# Patient Record
Sex: MALE | Race: BLACK OR AFRICAN AMERICAN | Employment: FULL TIME | URBAN - METROPOLITAN AREA
[De-identification: names, ages, dates, MRNs, and addresses within clinical notes are randomized per-mention and may not be internally consistent; named-entity substitution may affect disease eponyms.]

---

## 2023-07-11 ENCOUNTER — HOSPITAL ENCOUNTER (INPATIENT)
Age: 58
LOS: 3 days | Discharge: HOME | End: 2023-07-14
Attending: EMERGENCY MEDICINE | Admitting: INTERNAL MEDICINE
Payer: OTHER GOVERNMENT

## 2023-07-11 ENCOUNTER — APPOINTMENT (OUTPATIENT)
Dept: GENERAL RADIOLOGY | Age: 58
End: 2023-07-11
Payer: OTHER GOVERNMENT

## 2023-07-11 DIAGNOSIS — R07.9 CHEST PAIN, UNSPECIFIED TYPE: Primary | ICD-10-CM

## 2023-07-11 DIAGNOSIS — R06.02 SHORTNESS OF BREATH: ICD-10-CM

## 2023-07-11 LAB
ALBUMIN SERPL-MCNC: 4.2 G/DL (ref 3.4–5)
ALBUMIN/GLOB SERPL: 1.4 {RATIO} (ref 1.1–2.2)
ALP SERPL-CCNC: 70 U/L (ref 40–129)
ALT SERPL-CCNC: 30 U/L (ref 10–40)
ANION GAP SERPL CALCULATED.3IONS-SCNC: 16 MMOL/L (ref 3–16)
ANTI-XA UNFRAC HEPARIN: 0.83 IU/ML (ref 0.3–0.7)
APTT BLD: 27.1 SEC (ref 22.7–35.9)
AST SERPL-CCNC: 33 U/L (ref 15–37)
BASOPHILS # BLD: 0 K/UL (ref 0–0.2)
BASOPHILS NFR BLD: 0.8 %
BILIRUB SERPL-MCNC: 0.5 MG/DL (ref 0–1)
BUN SERPL-MCNC: 13 MG/DL (ref 7–20)
CALCIUM SERPL-MCNC: 8.9 MG/DL (ref 8.3–10.6)
CHLORIDE SERPL-SCNC: 102 MMOL/L (ref 99–110)
CO2 SERPL-SCNC: 20 MMOL/L (ref 21–32)
CREAT SERPL-MCNC: 0.6 MG/DL (ref 0.9–1.3)
D DIMER: 0.36 UG/ML FEU (ref 0–0.6)
DEPRECATED RDW RBC AUTO: 13.7 % (ref 12.4–15.4)
EKG ATRIAL RATE: 55 BPM
EKG ATRIAL RATE: 63 BPM
EKG DIAGNOSIS: NORMAL
EKG DIAGNOSIS: NORMAL
EKG P AXIS: 37 DEGREES
EKG P AXIS: 43 DEGREES
EKG P-R INTERVAL: 134 MS
EKG P-R INTERVAL: 136 MS
EKG Q-T INTERVAL: 410 MS
EKG Q-T INTERVAL: 428 MS
EKG QRS DURATION: 76 MS
EKG QRS DURATION: 88 MS
EKG QTC CALCULATION (BAZETT): 409 MS
EKG QTC CALCULATION (BAZETT): 419 MS
EKG R AXIS: -17 DEGREES
EKG R AXIS: -5 DEGREES
EKG T AXIS: -3 DEGREES
EKG T AXIS: 22 DEGREES
EKG VENTRICULAR RATE: 55 BPM
EKG VENTRICULAR RATE: 63 BPM
EOSINOPHIL # BLD: 0.1 K/UL (ref 0–0.6)
EOSINOPHIL NFR BLD: 2.7 %
GFR SERPLBLD CREATININE-BSD FMLA CKD-EPI: >60 ML/MIN/{1.73_M2}
GLUCOSE SERPL-MCNC: 117 MG/DL (ref 70–99)
HCT VFR BLD AUTO: 39.2 % (ref 40.5–52.5)
HGB BLD-MCNC: 12.4 G/DL (ref 13.5–17.5)
INR PPP: 0.94 (ref 0.84–1.16)
LEFT VENTRICULAR EJECTION FRACTION MODE: NORMAL
LIPASE SERPL-CCNC: 26 U/L (ref 13–60)
LV EF: 55 %
LYMPHOCYTES # BLD: 2.3 K/UL (ref 1–5.1)
LYMPHOCYTES NFR BLD: 46.1 %
MCH RBC QN AUTO: 27.9 PG (ref 26–34)
MCHC RBC AUTO-ENTMCNC: 31.6 G/DL (ref 31–36)
MCV RBC AUTO: 88.4 FL (ref 80–100)
MONOCYTES # BLD: 0.6 K/UL (ref 0–1.3)
MONOCYTES NFR BLD: 11.2 %
NEUTROPHILS # BLD: 2 K/UL (ref 1.7–7.7)
NEUTROPHILS NFR BLD: 39.2 %
NT-PROBNP SERPL-MCNC: <36 PG/ML (ref 0–124)
PLATELET # BLD AUTO: 218 K/UL (ref 135–450)
PMV BLD AUTO: 9.1 FL (ref 5–10.5)
POC ACT LR: 137 SEC
POC ACT LR: 209 SEC
POC ACT LR: 251 SEC
POC ACT LR: 258 SEC
POC ACT LR: 322 SEC
POC ACT LR: 337 SEC
POTASSIUM SERPL-SCNC: 3.7 MMOL/L (ref 3.5–5.1)
PROT SERPL-MCNC: 7.2 G/DL (ref 6.4–8.2)
PROTHROMBIN TIME: 12.6 SEC (ref 11.5–14.8)
RBC # BLD AUTO: 4.43 M/UL (ref 4.2–5.9)
SODIUM SERPL-SCNC: 138 MMOL/L (ref 136–145)
TROPONIN, HIGH SENSITIVITY: 11 NG/L (ref 0–22)
TROPONIN, HIGH SENSITIVITY: 20 NG/L (ref 0–22)
TROPONIN, HIGH SENSITIVITY: 491 NG/L (ref 0–22)
TROPONIN, HIGH SENSITIVITY: 905 NG/L (ref 0–22)
WBC # BLD AUTO: 5 K/UL (ref 4–11)

## 2023-07-11 PROCEDURE — 99153 MOD SED SAME PHYS/QHP EA: CPT | Performed by: INTERNAL MEDICINE

## 2023-07-11 PROCEDURE — 99152 MOD SED SAME PHYS/QHP 5/>YRS: CPT

## 2023-07-11 PROCEDURE — 027135Z DILATION OF CORONARY ARTERY, TWO ARTERIES WITH TWO DRUG-ELUTING INTRALUMINAL DEVICES, PERCUTANEOUS APPROACH: ICD-10-PCS | Performed by: INTERNAL MEDICINE

## 2023-07-11 PROCEDURE — 71045 X-RAY EXAM CHEST 1 VIEW: CPT

## 2023-07-11 PROCEDURE — 96365 THER/PROPH/DIAG IV INF INIT: CPT

## 2023-07-11 PROCEDURE — 93458 L HRT ARTERY/VENTRICLE ANGIO: CPT

## 2023-07-11 PROCEDURE — 2500000003 HC RX 250 WO HCPCS

## 2023-07-11 PROCEDURE — 6360000004 HC RX CONTRAST MEDICATION: Performed by: INTERNAL MEDICINE

## 2023-07-11 PROCEDURE — 2580000003 HC RX 258: Performed by: NURSE PRACTITIONER

## 2023-07-11 PROCEDURE — 99152 MOD SED SAME PHYS/QHP 5/>YRS: CPT | Performed by: INTERNAL MEDICINE

## 2023-07-11 PROCEDURE — C1769 GUIDE WIRE: HCPCS

## 2023-07-11 PROCEDURE — 92928 PRQ TCAT PLMT NTRAC ST 1 LES: CPT

## 2023-07-11 PROCEDURE — C1725 CATH, TRANSLUMIN NON-LASER: HCPCS

## 2023-07-11 PROCEDURE — 6360000002 HC RX W HCPCS

## 2023-07-11 PROCEDURE — 93005 ELECTROCARDIOGRAM TRACING: CPT | Performed by: EMERGENCY MEDICINE

## 2023-07-11 PROCEDURE — 2709999900 HC NON-CHARGEABLE SUPPLY

## 2023-07-11 PROCEDURE — 93458 L HRT ARTERY/VENTRICLE ANGIO: CPT | Performed by: INTERNAL MEDICINE

## 2023-07-11 PROCEDURE — C1874 STENT, COATED/COV W/DEL SYS: HCPCS

## 2023-07-11 PROCEDURE — B2111ZZ FLUOROSCOPY OF MULTIPLE CORONARY ARTERIES USING LOW OSMOLAR CONTRAST: ICD-10-PCS | Performed by: INTERNAL MEDICINE

## 2023-07-11 PROCEDURE — 92928 PRQ TCAT PLMT NTRAC ST 1 LES: CPT | Performed by: INTERNAL MEDICINE

## 2023-07-11 PROCEDURE — 99291 CRITICAL CARE FIRST HOUR: CPT | Performed by: INTERNAL MEDICINE

## 2023-07-11 PROCEDURE — 6370000000 HC RX 637 (ALT 250 FOR IP): Performed by: PHYSICIAN ASSISTANT

## 2023-07-11 PROCEDURE — 83690 ASSAY OF LIPASE: CPT

## 2023-07-11 PROCEDURE — 6360000002 HC RX W HCPCS: Performed by: NURSE PRACTITIONER

## 2023-07-11 PROCEDURE — 83880 ASSAY OF NATRIURETIC PEPTIDE: CPT

## 2023-07-11 PROCEDURE — 93005 ELECTROCARDIOGRAM TRACING: CPT | Performed by: PHYSICIAN ASSISTANT

## 2023-07-11 PROCEDURE — 6370000000 HC RX 637 (ALT 250 FOR IP): Performed by: INTERNAL MEDICINE

## 2023-07-11 PROCEDURE — 6360000002 HC RX W HCPCS: Performed by: INTERNAL MEDICINE

## 2023-07-11 PROCEDURE — C1887 CATHETER, GUIDING: HCPCS

## 2023-07-11 PROCEDURE — 80053 COMPREHEN METABOLIC PANEL: CPT

## 2023-07-11 PROCEDURE — 6360000002 HC RX W HCPCS: Performed by: PHYSICIAN ASSISTANT

## 2023-07-11 PROCEDURE — 2060000000 HC ICU INTERMEDIATE R&B

## 2023-07-11 PROCEDURE — 96375 TX/PRO/DX INJ NEW DRUG ADDON: CPT

## 2023-07-11 PROCEDURE — C1894 INTRO/SHEATH, NON-LASER: HCPCS

## 2023-07-11 PROCEDURE — 6370000000 HC RX 637 (ALT 250 FOR IP)

## 2023-07-11 PROCEDURE — 85730 THROMBOPLASTIN TIME PARTIAL: CPT

## 2023-07-11 PROCEDURE — 1200000000 HC SEMI PRIVATE

## 2023-07-11 PROCEDURE — 96366 THER/PROPH/DIAG IV INF ADDON: CPT

## 2023-07-11 PROCEDURE — 84484 ASSAY OF TROPONIN QUANT: CPT

## 2023-07-11 PROCEDURE — 36415 COLL VENOUS BLD VENIPUNCTURE: CPT

## 2023-07-11 PROCEDURE — 93010 ELECTROCARDIOGRAM REPORT: CPT | Performed by: INTERNAL MEDICINE

## 2023-07-11 PROCEDURE — 85520 HEPARIN ASSAY: CPT

## 2023-07-11 PROCEDURE — 99285 EMERGENCY DEPT VISIT HI MDM: CPT

## 2023-07-11 PROCEDURE — 85379 FIBRIN DEGRADATION QUANT: CPT

## 2023-07-11 PROCEDURE — 85025 COMPLETE CBC W/AUTO DIFF WBC: CPT

## 2023-07-11 PROCEDURE — 4A023N7 MEASUREMENT OF CARDIAC SAMPLING AND PRESSURE, LEFT HEART, PERCUTANEOUS APPROACH: ICD-10-PCS | Performed by: INTERNAL MEDICINE

## 2023-07-11 PROCEDURE — 93005 ELECTROCARDIOGRAM TRACING: CPT | Performed by: INTERNAL MEDICINE

## 2023-07-11 PROCEDURE — 85610 PROTHROMBIN TIME: CPT

## 2023-07-11 PROCEDURE — 99153 MOD SED SAME PHYS/QHP EA: CPT

## 2023-07-11 PROCEDURE — 85347 COAGULATION TIME ACTIVATED: CPT

## 2023-07-11 RX ORDER — HEPARIN SODIUM 1000 [USP'U]/ML
2000 INJECTION, SOLUTION INTRAVENOUS; SUBCUTANEOUS PRN
Status: DISCONTINUED | OUTPATIENT
Start: 2023-07-11 | End: 2023-07-11

## 2023-07-11 RX ORDER — PREGABALIN 150 MG/1
150 CAPSULE ORAL 2 TIMES DAILY
COMMUNITY

## 2023-07-11 RX ORDER — NITROGLYCERIN 0.4 MG/1
0.4 TABLET SUBLINGUAL EVERY 5 MIN PRN
Status: DISCONTINUED | OUTPATIENT
Start: 2023-07-11 | End: 2023-07-11

## 2023-07-11 RX ORDER — ATORVASTATIN CALCIUM 80 MG/1
40 TABLET, FILM COATED ORAL NIGHTLY
Status: DISCONTINUED | OUTPATIENT
Start: 2023-07-11 | End: 2023-07-11 | Stop reason: SDUPTHER

## 2023-07-11 RX ORDER — ACETAMINOPHEN 325 MG/1
650 TABLET ORAL EVERY 6 HOURS PRN
Status: DISCONTINUED | OUTPATIENT
Start: 2023-07-11 | End: 2023-07-14 | Stop reason: HOSPADM

## 2023-07-11 RX ORDER — POLYETHYLENE GLYCOL 3350 17 G/17G
17 POWDER, FOR SOLUTION ORAL DAILY PRN
Status: DISCONTINUED | OUTPATIENT
Start: 2023-07-11 | End: 2023-07-14 | Stop reason: HOSPADM

## 2023-07-11 RX ORDER — ACETAMINOPHEN 650 MG/1
650 SUPPOSITORY RECTAL EVERY 6 HOURS PRN
Status: DISCONTINUED | OUTPATIENT
Start: 2023-07-11 | End: 2023-07-14 | Stop reason: HOSPADM

## 2023-07-11 RX ORDER — SODIUM CHLORIDE 9 MG/ML
INJECTION, SOLUTION INTRAVENOUS CONTINUOUS
Status: DISCONTINUED | OUTPATIENT
Start: 2023-07-11 | End: 2023-07-12

## 2023-07-11 RX ORDER — NITROGLYCERIN 0.4 MG/1
0.4 TABLET SUBLINGUAL EVERY 5 MIN PRN
Status: COMPLETED | OUTPATIENT
Start: 2023-07-11 | End: 2023-07-13

## 2023-07-11 RX ORDER — SODIUM CHLORIDE 9 MG/ML
INJECTION, SOLUTION INTRAVENOUS PRN
Status: DISCONTINUED | OUTPATIENT
Start: 2023-07-11 | End: 2023-07-14 | Stop reason: HOSPADM

## 2023-07-11 RX ORDER — SODIUM CHLORIDE 0.9 % (FLUSH) 0.9 %
5-40 SYRINGE (ML) INJECTION PRN
Status: DISCONTINUED | OUTPATIENT
Start: 2023-07-11 | End: 2023-07-14 | Stop reason: HOSPADM

## 2023-07-11 RX ORDER — HEPARIN SODIUM 1000 [USP'U]/ML
4000 INJECTION, SOLUTION INTRAVENOUS; SUBCUTANEOUS ONCE
Status: COMPLETED | OUTPATIENT
Start: 2023-07-11 | End: 2023-07-11

## 2023-07-11 RX ORDER — OXYCODONE HYDROCHLORIDE AND ACETAMINOPHEN 5; 325 MG/1; MG/1
1 TABLET ORAL 2 TIMES DAILY
COMMUNITY

## 2023-07-11 RX ORDER — ONDANSETRON 2 MG/ML
4 INJECTION INTRAMUSCULAR; INTRAVENOUS ONCE
Status: COMPLETED | OUTPATIENT
Start: 2023-07-11 | End: 2023-07-11

## 2023-07-11 RX ORDER — MORPHINE SULFATE 4 MG/ML
4 INJECTION, SOLUTION INTRAMUSCULAR; INTRAVENOUS ONCE
Status: COMPLETED | OUTPATIENT
Start: 2023-07-11 | End: 2023-07-11

## 2023-07-11 RX ORDER — CLOPIDOGREL BISULFATE 75 MG/1
75 TABLET ORAL DAILY
Status: DISCONTINUED | OUTPATIENT
Start: 2023-07-12 | End: 2023-07-14 | Stop reason: HOSPADM

## 2023-07-11 RX ORDER — ONDANSETRON 2 MG/ML
4 INJECTION INTRAMUSCULAR; INTRAVENOUS EVERY 6 HOURS PRN
Status: DISCONTINUED | OUTPATIENT
Start: 2023-07-11 | End: 2023-07-11

## 2023-07-11 RX ORDER — PREGABALIN 75 MG/1
150 CAPSULE ORAL 2 TIMES DAILY
Status: DISCONTINUED | OUTPATIENT
Start: 2023-07-11 | End: 2023-07-14 | Stop reason: HOSPADM

## 2023-07-11 RX ORDER — PANTOPRAZOLE SODIUM 40 MG/1
40 TABLET, DELAYED RELEASE ORAL
Status: DISCONTINUED | OUTPATIENT
Start: 2023-07-11 | End: 2023-07-13

## 2023-07-11 RX ORDER — ONDANSETRON 4 MG/1
4 TABLET, ORALLY DISINTEGRATING ORAL EVERY 8 HOURS PRN
Status: DISCONTINUED | OUTPATIENT
Start: 2023-07-11 | End: 2023-07-14 | Stop reason: HOSPADM

## 2023-07-11 RX ORDER — HEPARIN SODIUM 10000 [USP'U]/100ML
5-30 INJECTION, SOLUTION INTRAVENOUS CONTINUOUS
Status: DISCONTINUED | OUTPATIENT
Start: 2023-07-11 | End: 2023-07-11

## 2023-07-11 RX ORDER — SODIUM CHLORIDE 0.9 % (FLUSH) 0.9 %
5-40 SYRINGE (ML) INJECTION EVERY 12 HOURS SCHEDULED
Status: DISCONTINUED | OUTPATIENT
Start: 2023-07-11 | End: 2023-07-14 | Stop reason: HOSPADM

## 2023-07-11 RX ORDER — SODIUM CHLORIDE 9 MG/ML
INJECTION, SOLUTION INTRAVENOUS PRN
Status: DISCONTINUED | OUTPATIENT
Start: 2023-07-11 | End: 2023-07-11

## 2023-07-11 RX ORDER — ACETAMINOPHEN 325 MG/1
650 TABLET ORAL EVERY 4 HOURS PRN
Status: DISCONTINUED | OUTPATIENT
Start: 2023-07-11 | End: 2023-07-11

## 2023-07-11 RX ORDER — ONDANSETRON 2 MG/ML
4 INJECTION INTRAMUSCULAR; INTRAVENOUS EVERY 6 HOURS PRN
Status: DISCONTINUED | OUTPATIENT
Start: 2023-07-11 | End: 2023-07-14 | Stop reason: HOSPADM

## 2023-07-11 RX ORDER — AMLODIPINE BESYLATE 5 MG/1
5 TABLET ORAL DAILY
Status: ON HOLD | COMMUNITY
End: 2023-07-14 | Stop reason: HOSPADM

## 2023-07-11 RX ORDER — HEPARIN SODIUM 1000 [USP'U]/ML
4000 INJECTION, SOLUTION INTRAVENOUS; SUBCUTANEOUS PRN
Status: DISCONTINUED | OUTPATIENT
Start: 2023-07-11 | End: 2023-07-11

## 2023-07-11 RX ORDER — VALSARTAN 40 MG/1
40 TABLET ORAL DAILY
Status: DISCONTINUED | OUTPATIENT
Start: 2023-07-11 | End: 2023-07-14 | Stop reason: HOSPADM

## 2023-07-11 RX ORDER — EPTIFIBATIDE 0.75 MG/ML
2 INJECTION, SOLUTION INTRAVENOUS CONTINUOUS
Status: DISCONTINUED | OUTPATIENT
Start: 2023-07-11 | End: 2023-07-12

## 2023-07-11 RX ORDER — OXYCODONE HYDROCHLORIDE AND ACETAMINOPHEN 5; 325 MG/1; MG/1
1 TABLET ORAL 2 TIMES DAILY
Status: DISCONTINUED | OUTPATIENT
Start: 2023-07-11 | End: 2023-07-14 | Stop reason: HOSPADM

## 2023-07-11 RX ORDER — ASPIRIN 81 MG/1
81 TABLET ORAL DAILY
Status: DISCONTINUED | OUTPATIENT
Start: 2023-07-12 | End: 2023-07-14 | Stop reason: HOSPADM

## 2023-07-11 RX ORDER — AMLODIPINE BESYLATE 5 MG/1
5 TABLET ORAL DAILY
Status: DISCONTINUED | OUTPATIENT
Start: 2023-07-11 | End: 2023-07-12

## 2023-07-11 RX ORDER — ENOXAPARIN SODIUM 100 MG/ML
40 INJECTION SUBCUTANEOUS DAILY
Status: DISCONTINUED | OUTPATIENT
Start: 2023-07-11 | End: 2023-07-14 | Stop reason: HOSPADM

## 2023-07-11 RX ORDER — ROSUVASTATIN CALCIUM 40 MG/1
40 TABLET, COATED ORAL NIGHTLY
Status: DISCONTINUED | OUTPATIENT
Start: 2023-07-11 | End: 2023-07-14 | Stop reason: HOSPADM

## 2023-07-11 RX ORDER — ASPIRIN 81 MG/1
81 TABLET, CHEWABLE ORAL DAILY
Status: DISCONTINUED | OUTPATIENT
Start: 2023-07-12 | End: 2023-07-11

## 2023-07-11 RX ADMIN — MORPHINE SULFATE 4 MG: 4 INJECTION, SOLUTION INTRAMUSCULAR; INTRAVENOUS at 09:25

## 2023-07-11 RX ADMIN — ENOXAPARIN SODIUM 40 MG: 100 INJECTION SUBCUTANEOUS at 21:15

## 2023-07-11 RX ADMIN — PANTOPRAZOLE SODIUM 40 MG: 40 TABLET, DELAYED RELEASE ORAL at 21:16

## 2023-07-11 RX ADMIN — OXYCODONE AND ACETAMINOPHEN 1 TABLET: 5; 325 TABLET ORAL at 20:22

## 2023-07-11 RX ADMIN — EPTIFIBATIDE 2 MCG/KG/MIN: 0.75 INJECTION INTRAVENOUS at 23:51

## 2023-07-11 RX ADMIN — NITROGLYCERIN 0.4 MG: 0.4 TABLET, ORALLY DISINTEGRATING SUBLINGUAL at 20:08

## 2023-07-11 RX ADMIN — HEPARIN SODIUM 4000 UNITS: 1000 INJECTION INTRAVENOUS; SUBCUTANEOUS at 12:57

## 2023-07-11 RX ADMIN — VALSARTAN 40 MG: 40 TABLET, FILM COATED ORAL at 21:15

## 2023-07-11 RX ADMIN — PROMETHAZINE HYDROCHLORIDE 12.5 MG: 25 INJECTION INTRAMUSCULAR; INTRAVENOUS at 20:36

## 2023-07-11 RX ADMIN — HEPARIN SODIUM 12 UNITS/KG/HR: 10000 INJECTION, SOLUTION INTRAVENOUS at 12:59

## 2023-07-11 RX ADMIN — ROSUVASTATIN 40 MG: 10 TABLET, FILM COATED ORAL at 21:15

## 2023-07-11 RX ADMIN — AMLODIPINE BESYLATE 5 MG: 5 TABLET ORAL at 21:14

## 2023-07-11 RX ADMIN — METOPROLOL TARTRATE 25 MG: 25 TABLET, FILM COATED ORAL at 21:15

## 2023-07-11 RX ADMIN — NITROGLYCERIN 0.4 MG: 0.4 TABLET, ORALLY DISINTEGRATING SUBLINGUAL at 09:27

## 2023-07-11 RX ADMIN — PREGABALIN 150 MG: 75 CAPSULE ORAL at 21:15

## 2023-07-11 RX ADMIN — ONDANSETRON 4 MG: 2 INJECTION INTRAMUSCULAR; INTRAVENOUS at 09:26

## 2023-07-11 RX ADMIN — IOPAMIDOL 174 ML: 755 INJECTION, SOLUTION INTRAVENOUS at 19:46

## 2023-07-11 ASSESSMENT — PAIN - FUNCTIONAL ASSESSMENT
PAIN_FUNCTIONAL_ASSESSMENT: PREVENTS OR INTERFERES WITH ALL ACTIVE AND SOME PASSIVE ACTIVITIES
PAIN_FUNCTIONAL_ASSESSMENT: 0-10

## 2023-07-11 ASSESSMENT — PAIN DESCRIPTION - FREQUENCY: FREQUENCY: CONTINUOUS

## 2023-07-11 ASSESSMENT — HEART SCORE: ECG: 1

## 2023-07-11 ASSESSMENT — PAIN DESCRIPTION - LOCATION
LOCATION: CHEST

## 2023-07-11 ASSESSMENT — PAIN SCALES - GENERAL
PAINLEVEL_OUTOF10: 4
PAINLEVEL_OUTOF10: 3
PAINLEVEL_OUTOF10: 2
PAINLEVEL_OUTOF10: 4
PAINLEVEL_OUTOF10: 0
PAINLEVEL_OUTOF10: 0

## 2023-07-11 ASSESSMENT — ENCOUNTER SYMPTOMS
WHEEZING: 0
NAUSEA: 0
CHEST TIGHTNESS: 0
SHORTNESS OF BREATH: 1
DIARRHEA: 0
ABDOMINAL PAIN: 0
COUGH: 0
VOMITING: 0

## 2023-07-11 ASSESSMENT — LIFESTYLE VARIABLES: HOW OFTEN DO YOU HAVE A DRINK CONTAINING ALCOHOL: MONTHLY OR LESS

## 2023-07-11 ASSESSMENT — PAIN DESCRIPTION - PAIN TYPE: TYPE: ACUTE PAIN;SURGICAL PAIN

## 2023-07-11 ASSESSMENT — PAIN DESCRIPTION - ONSET: ONSET: ON-GOING

## 2023-07-11 NOTE — ED PROVIDER NOTES
I was available for consultation during patient's ED stay. Patient was cared for by GAGE and previous attending. Patient was admitted at the time that I received his EKG. I did not evaluate or participate in patient care. EKG Interpretation    Interpreted by emergency department physician    Rhythm: normal sinus   Rate: normal  Axis: left  Ectopy: none  Conduction: normal  ST Segments: flattening in  v3, v4, v5, and v6  T Waves: flattening in  v3, v4, v5, and v6  Q Waves: none    Clinical Impression: Normal sinus rhythm with left axis deviation and signs of inferior and lateral injury. Relatively similar to previous EKG from this visit but the T wave flattening in the lateral leads appears new concerning for acute ischemia. Interpreted by myself.     Naida Cowden, MD Naida Cowden, MD  07/11/23 8705

## 2023-07-11 NOTE — H&P
V2.0  History and Physical      Name:  Aracely Frey /Age/Sex: 1965  (65 y.o. male)   MRN & CSN:  [id number] & [id number] Encounter Date/Time: 2023 6:25 PM EDT   Location:  [facility] PCP: No primary care provider on file. Hospital Day: 1    Assessment and Plan:   Aracely Frey is a 65 y.o. male with a pmh of HTN who presented with Chest pain admitted with ACS. Hospital Problems             Last Modified POA    * (Principal) Chest pain 2023 Yes       Plan:    ACS/NSTEMI:  On heparin drip. Cardiology consulted: Coronary angiogram scheduled for today. Further recommendations following [facility]. Echocardiogram.  Aspirin, statin, lipid panel, A1c    HTN: Monitor BP on home medications. Former smoker: Smoking cessation reiterated. Disposition:   Current Living situation: Home  Expected Disposition: Home  Estimated D/C: 2023    Diet Diet NPO Exceptions are: Sips of Water with Meds  ADULT DIET; Regular; Low Fat/Low Chol/High Fiber/2 gm Na   DVT Prophylaxis [] Lovenox, [x]  Heparin drip, [] SCDs, [] Ambulation,  [] Eliquis, [] Xarelto, [] Coumadin   Code Status No Order   Surrogate Decision Maker/ POA spouse     Personally reviewed Lab Studies and Imaging     EKG interpreted personally and results:       History from:     patient    History of Present Illness:     Chief Complaint: Chest Pain    Aracely Frey is a 65 y.o. male with pmh of HTN, smoker quit 2 months ago, presented from home with complaints of chest pain. Onset earlier this morning on waking up, described as a tightness/pressure, no provoking, aggravating or relieving factors, radiated to the head, associated with nausea, dyspnea and diaphoresis. He denied any drug use or previous heart disease. Family history of MI in his mom at age 38-51's. In the ED initial troponin was 20 with EKG NSR with nonspecific diffuse ST-T changes. Trended up to 491-->905 with repeat EKG showing acute MI in the lateral leads.   Patient was Cigarettes    Smokeless tobacco: Never   Vaping Use    Vaping Use: Never used   Substance and Sexual Activity    Alcohol use: Yes     Comment: couple times a month    Drug use: Never       Medications:   Medications:    Infusions:    heparin (PORCINE) Infusion 12 Units/kg/hr (07/11/23 1259)     PRN Meds: nitroGLYCERIN, 0.4 mg, Q5 Min PRN  heparin (porcine), 4,000 Units, PRN  heparin (porcine), 2,000 Units, PRN        Labs      CBC:   Recent Labs     07/11/23  0855   WBC 5.0   HGB 12.4*        BMP:    Recent Labs     07/11/23  0855      K 3.7      CO2 20*   BUN 13   CREATININE 0.6*   GLUCOSE 117*     Hepatic:   Recent Labs     07/11/23  0855   AST 33   ALT 30   BILITOT 0.5   ALKPHOS 70     Lipids: No results found for: CHOL, HDL, TRIG  Hemoglobin A1C: No results found for: LABA1C  TSH: No results found for: TSH  Troponin: No results found for: TROPONINT  Lactic Acid: No results for input(s): LACTA in the last 72 hours. BNP:   Recent Labs     07/11/23  0855   PROBNP <36     UA:No results found for: Arm[employer], PHUR, LABCAST, WBCUA, RBCUA, MUCUS, TRICHOMONAS, YEAST, BACTERIA, CLARITYU, SPECGRAV, LEUKOCYTESUR, UROBILINOGEN, BILIRUBINUR, BLOODU, GLUCOSEU, KETUA, AMORPHOUS  Urine Cultures: No results found for: LABURIN  Blood Cultures: No results found for: BC  No results found for: BLOODCULT2  Organism: No results found for: ORG    Imaging/Diagnostics Last 24 Hours   XR CHEST PORTABLE    Result Date: 7/11/2023  EXAMINATION: ONE XRAY VIEW OF THE CHEST 7/11/2023 9:27 am COMPARISON: None. HISTORY: ORDERING SYSTEM PROVIDED HISTORY: CP TECHNOLOGIST PROVIDED HISTORY: Reason for exam:->CP Reason for Exam: Chest pain FINDINGS: Clear lungs. Normal heart size. No pneumothorax noted. Mild osteopenic changes and degenerative changes identified in the bony structures. . Surgical changes in the lower thoracic upper lumbar vertebra. No acute cardiopulmonary process.          Electronically signed by Nigel Mckeon

## 2023-07-11 NOTE — PROGRESS NOTES
Arrived at ED 08 to initiate admission process, but Dr. Jeniffer Delaney in room. Will return as time allows.

## 2023-07-11 NOTE — ED NOTES
Per Dr Jeannette Starks no need for the additional EKG d/t artifact. Pt transported to cath lab via this nurse and tech x1 on life pack.        Royce Cooper RN  07/11/23 5715

## 2023-07-11 NOTE — PROGRESS NOTES
Pharmacy Home Medication Reconciliation Note    A medication reconciliation has been completed for Fran Carolina 1965    Pharmacy: [facility], [address] [place] [address]  Information provided by: patient    The patient's home medication list is as follows: No current facility-administered medications on file prior to encounter. Current Outpatient Medications on File Prior to Encounter   Medication Sig Dispense Refill    amLODIPine (NORVASC) 5 MG tablet Take 1 tablet by mouth daily      pregabalin (LYRICA) 150 MG capsule Take 1 capsule by mouth 2 times daily. Max Daily Amount: 300 mg      oxyCODONE-acetaminophen (PERCOCET) 5-325 MG per tablet Take 1 tablet by mouth 2 times daily. Max Daily Amount: 2 tablets         Of note, patient is from [facility]: listed current meds: took them yesterday but not today. Patient would like to take advantage of the \"meds to beds\" program if needing anything upon discharge. Timing of last doses updated. Thank you,  Avila Wang Second

## 2023-07-11 NOTE — CONSULTS
[address] [place] [address]  [phone]        Reason for Consultation/Chief Complaint: \" Non-STEMI. \"    History of Present Illness:  Michael Meneses is a 65 y.o. patient who presented to the hospital with complaints of chest discomfort. Left sided chest discomfort, radiating to head. Associated nausea and diaphoresis. Currently now 2 out of 10. Does note some intermittent palpitations. He is a  and actually lives in [place]. Medical care has been through the [facility] system. Past Medical History:   has a past medical history of Hypertension. Surgical History:   has a past surgical history that includes back surgery. Social History:   reports that he has quit smoking. His smoking use included cigarettes. He has never used smokeless tobacco. He reports current alcohol use. He reports that he does not use drugs. Family History:  family history is not on file. Home Medications:  Were reviewed and are listed in nursing record. and/or listed below  Prior to Admission medications    Medication Sig Start Date End Date Taking? Authorizing Provider   amLODIPine (NORVASC) 5 MG tablet Take 1 tablet by mouth daily   Yes Historical Provider, MD   pregabalin (LYRICA) 150 MG capsule Take 1 capsule by mouth 2 times daily. Max Daily Amount: 300 mg   Yes Historical Provider, MD   oxyCODONE-acetaminophen (PERCOCET) 5-325 MG per tablet Take 1 tablet by mouth 2 times daily. Max Daily Amount: 2 tablets   Yes Historical Provider, MD        Allergies:  Pcn [penicillins], Zomig [zolmitriptan], and Mushroom extract complex     Review of Systems:   A complete review of systems has been reviewed and updated today and is negative except as noted in the history of present illness.       Physical Examination:    Vitals:    07/11/23 1100   BP: 129/78   Pulse: 55   Resp: 16   Temp:    SpO2: 98%    Weight - Scale: 163 lb (73.9 kg)         General Appearance:  Alert, cooperative, no distress, appears

## 2023-07-12 ENCOUNTER — APPOINTMENT (OUTPATIENT)
Dept: CT IMAGING | Age: 58
End: 2023-07-12
Payer: OTHER GOVERNMENT

## 2023-07-12 ENCOUNTER — APPOINTMENT (OUTPATIENT)
Dept: MRI IMAGING | Age: 58
End: 2023-07-12
Payer: OTHER GOVERNMENT

## 2023-07-12 DIAGNOSIS — I25.10 CORONARY ARTERY DISEASE DUE TO LIPID RICH PLAQUE: Primary | ICD-10-CM

## 2023-07-12 DIAGNOSIS — I25.83 CORONARY ARTERY DISEASE DUE TO LIPID RICH PLAQUE: Primary | ICD-10-CM

## 2023-07-12 PROBLEM — R47.9 SPEECH DISTURBANCE: Status: ACTIVE | Noted: 2023-07-12

## 2023-07-12 LAB
ALBUMIN SERPL-MCNC: 3.7 G/DL (ref 3.4–5)
ALBUMIN/GLOB SERPL: 1.3 {RATIO} (ref 1.1–2.2)
ALP SERPL-CCNC: 64 U/L (ref 40–129)
ALT SERPL-CCNC: 35 U/L (ref 10–40)
ANION GAP SERPL CALCULATED.3IONS-SCNC: 12 MMOL/L (ref 3–16)
AST SERPL-CCNC: 81 U/L (ref 15–37)
BILIRUB SERPL-MCNC: 0.4 MG/DL (ref 0–1)
BUN SERPL-MCNC: 9 MG/DL (ref 7–20)
CALCIUM SERPL-MCNC: 8.4 MG/DL (ref 8.3–10.6)
CHLORIDE SERPL-SCNC: 105 MMOL/L (ref 99–110)
CHOLEST SERPL-MCNC: 160 MG/DL (ref 0–199)
CO2 SERPL-SCNC: 21 MMOL/L (ref 21–32)
CREAT SERPL-MCNC: 0.6 MG/DL (ref 0.9–1.3)
DEPRECATED RDW RBC AUTO: 13.2 % (ref 12.4–15.4)
EKG ATRIAL RATE: 62 BPM
EKG ATRIAL RATE: 71 BPM
EKG DIAGNOSIS: NORMAL
EKG DIAGNOSIS: NORMAL
EKG P AXIS: 38 DEGREES
EKG P AXIS: 5 DEGREES
EKG P-R INTERVAL: 124 MS
EKG P-R INTERVAL: 128 MS
EKG Q-T INTERVAL: 384 MS
EKG Q-T INTERVAL: 394 MS
EKG QRS DURATION: 76 MS
EKG QRS DURATION: 90 MS
EKG QTC CALCULATION (BAZETT): 399 MS
EKG QTC CALCULATION (BAZETT): 417 MS
EKG R AXIS: -32 DEGREES
EKG R AXIS: -35 DEGREES
EKG T AXIS: -19 DEGREES
EKG T AXIS: -23 DEGREES
EKG VENTRICULAR RATE: 62 BPM
EKG VENTRICULAR RATE: 71 BPM
FOLATE SERPL-MCNC: 7.89 NG/ML (ref 4.78–24.2)
GFR SERPLBLD CREATININE-BSD FMLA CKD-EPI: >60 ML/MIN/{1.73_M2}
GLUCOSE SERPL-MCNC: 128 MG/DL (ref 70–99)
HCT VFR BLD AUTO: 35.2 % (ref 40.5–52.5)
HDLC SERPL-MCNC: 37 MG/DL (ref 40–60)
HGB BLD-MCNC: 11.3 G/DL (ref 13.5–17.5)
LDLC SERPL CALC-MCNC: 108 MG/DL
LV EF: 58 %
LVEF MODALITY: NORMAL
MAGNESIUM SERPL-MCNC: 1.8 MG/DL (ref 1.8–2.4)
MCH RBC QN AUTO: 28.2 PG (ref 26–34)
MCHC RBC AUTO-ENTMCNC: 32 G/DL (ref 31–36)
MCV RBC AUTO: 88.1 FL (ref 80–100)
PHOSPHATE SERPL-MCNC: 4.2 MG/DL (ref 2.5–4.9)
PLATELET # BLD AUTO: 221 K/UL (ref 135–450)
PMV BLD AUTO: 8.5 FL (ref 5–10.5)
POTASSIUM SERPL-SCNC: 3.9 MMOL/L (ref 3.5–5.1)
PROT SERPL-MCNC: 6.5 G/DL (ref 6.4–8.2)
RBC # BLD AUTO: 3.99 M/UL (ref 4.2–5.9)
SODIUM SERPL-SCNC: 138 MMOL/L (ref 136–145)
TRIGL SERPL-MCNC: 76 MG/DL (ref 0–150)
VIT B12 SERPL-MCNC: 745 PG/ML (ref 211–911)
VLDLC SERPL CALC-MCNC: 15 MG/DL
WBC # BLD AUTO: 6.4 K/UL (ref 4–11)

## 2023-07-12 PROCEDURE — 36415 COLL VENOUS BLD VENIPUNCTURE: CPT

## 2023-07-12 PROCEDURE — 93306 TTE W/DOPPLER COMPLETE: CPT

## 2023-07-12 PROCEDURE — 83550 IRON BINDING TEST: CPT

## 2023-07-12 PROCEDURE — 93010 ELECTROCARDIOGRAM REPORT: CPT | Performed by: INTERNAL MEDICINE

## 2023-07-12 PROCEDURE — 6370000000 HC RX 637 (ALT 250 FOR IP): Performed by: NURSE PRACTITIONER

## 2023-07-12 PROCEDURE — 70551 MRI BRAIN STEM W/O DYE: CPT

## 2023-07-12 PROCEDURE — 83540 ASSAY OF IRON: CPT

## 2023-07-12 PROCEDURE — 70450 CT HEAD/BRAIN W/O DYE: CPT

## 2023-07-12 PROCEDURE — 80053 COMPREHEN METABOLIC PANEL: CPT

## 2023-07-12 PROCEDURE — 83036 HEMOGLOBIN GLYCOSYLATED A1C: CPT

## 2023-07-12 PROCEDURE — 84100 ASSAY OF PHOSPHORUS: CPT

## 2023-07-12 PROCEDURE — 82728 ASSAY OF FERRITIN: CPT

## 2023-07-12 PROCEDURE — 2580000003 HC RX 258: Performed by: INTERNAL MEDICINE

## 2023-07-12 PROCEDURE — 85027 COMPLETE CBC AUTOMATED: CPT

## 2023-07-12 PROCEDURE — 82607 VITAMIN B-12: CPT

## 2023-07-12 PROCEDURE — 6370000000 HC RX 637 (ALT 250 FOR IP): Performed by: INTERNAL MEDICINE

## 2023-07-12 PROCEDURE — 99223 1ST HOSP IP/OBS HIGH 75: CPT

## 2023-07-12 PROCEDURE — 82746 ASSAY OF FOLIC ACID SERUM: CPT

## 2023-07-12 PROCEDURE — 99233 SBSQ HOSP IP/OBS HIGH 50: CPT | Performed by: INTERNAL MEDICINE

## 2023-07-12 PROCEDURE — 83735 ASSAY OF MAGNESIUM: CPT

## 2023-07-12 PROCEDURE — 2060000000 HC ICU INTERMEDIATE R&B

## 2023-07-12 PROCEDURE — 80061 LIPID PANEL: CPT

## 2023-07-12 PROCEDURE — 6360000004 HC RX CONTRAST MEDICATION: Performed by: INTERNAL MEDICINE

## 2023-07-12 PROCEDURE — 70498 CT ANGIOGRAPHY NECK: CPT

## 2023-07-12 PROCEDURE — 1200000000 HC SEMI PRIVATE

## 2023-07-12 PROCEDURE — 6360000002 HC RX W HCPCS: Performed by: INTERNAL MEDICINE

## 2023-07-12 RX ORDER — MORPHINE SULFATE 2 MG/ML
2 INJECTION, SOLUTION INTRAMUSCULAR; INTRAVENOUS EVERY 4 HOURS PRN
Status: DISCONTINUED | OUTPATIENT
Start: 2023-07-12 | End: 2023-07-14 | Stop reason: HOSPADM

## 2023-07-12 RX ORDER — MORPHINE SULFATE 4 MG/ML
4 INJECTION, SOLUTION INTRAMUSCULAR; INTRAVENOUS EVERY 4 HOURS PRN
Status: DISCONTINUED | OUTPATIENT
Start: 2023-07-12 | End: 2023-07-14 | Stop reason: HOSPADM

## 2023-07-12 RX ORDER — GLYCERIN PEDIATRIC
1 SUPPOSITORY, RECTAL RECTAL ONCE
Status: DISCONTINUED | OUTPATIENT
Start: 2023-07-12 | End: 2023-07-12 | Stop reason: RX

## 2023-07-12 RX ORDER — MAG HYDROX/ALUMINUM HYD/SIMETH 400-400-40
1 SUSPENSION, ORAL (FINAL DOSE FORM) ORAL ONCE
Status: COMPLETED | OUTPATIENT
Start: 2023-07-12 | End: 2023-07-12

## 2023-07-12 RX ADMIN — Medication 10 ML: at 19:30

## 2023-07-12 RX ADMIN — METOPROLOL TARTRATE 25 MG: 25 TABLET, FILM COATED ORAL at 19:29

## 2023-07-12 RX ADMIN — SODIUM CHLORIDE, PRESERVATIVE FREE 10 ML: 5 INJECTION INTRAVENOUS at 10:01

## 2023-07-12 RX ADMIN — PREGABALIN 150 MG: 75 CAPSULE ORAL at 19:29

## 2023-07-12 RX ADMIN — METOPROLOL TARTRATE 25 MG: 25 TABLET, FILM COATED ORAL at 10:00

## 2023-07-12 RX ADMIN — GLYCERIN 2 G: 2 SUPPOSITORY RECTAL at 21:18

## 2023-07-12 RX ADMIN — ASPIRIN 81 MG: 81 TABLET, COATED ORAL at 09:59

## 2023-07-12 RX ADMIN — OXYCODONE AND ACETAMINOPHEN 1 TABLET: 5; 325 TABLET ORAL at 19:29

## 2023-07-12 RX ADMIN — ROSUVASTATIN 40 MG: 10 TABLET, FILM COATED ORAL at 19:29

## 2023-07-12 RX ADMIN — OXYCODONE AND ACETAMINOPHEN 1 TABLET: 5; 325 TABLET ORAL at 10:00

## 2023-07-12 RX ADMIN — CLOPIDOGREL BISULFATE 75 MG: 75 TABLET ORAL at 10:01

## 2023-07-12 RX ADMIN — MORPHINE SULFATE 2 MG: 2 INJECTION, SOLUTION INTRAMUSCULAR; INTRAVENOUS at 17:55

## 2023-07-12 RX ADMIN — SODIUM CHLORIDE: 9 INJECTION, SOLUTION INTRAVENOUS at 01:50

## 2023-07-12 RX ADMIN — ENOXAPARIN SODIUM 40 MG: 100 INJECTION SUBCUTANEOUS at 10:00

## 2023-07-12 RX ADMIN — NITROGLYCERIN 0.4 MG: 0.4 TABLET, ORALLY DISINTEGRATING SUBLINGUAL at 21:20

## 2023-07-12 RX ADMIN — PREGABALIN 150 MG: 75 CAPSULE ORAL at 09:59

## 2023-07-12 RX ADMIN — VALSARTAN 40 MG: 40 TABLET, FILM COATED ORAL at 10:00

## 2023-07-12 RX ADMIN — PANTOPRAZOLE SODIUM 40 MG: 40 TABLET, DELAYED RELEASE ORAL at 05:42

## 2023-07-12 RX ADMIN — IOPAMIDOL 75 ML: 755 INJECTION, SOLUTION INTRAVENOUS at 13:09

## 2023-07-12 ASSESSMENT — PAIN SCALES - GENERAL
PAINLEVEL_OUTOF10: 0
PAINLEVEL_OUTOF10: 2
PAINLEVEL_OUTOF10: 4
PAINLEVEL_OUTOF10: 0
PAINLEVEL_OUTOF10: 2
PAINLEVEL_OUTOF10: 7
PAINLEVEL_OUTOF10: 4
PAINLEVEL_OUTOF10: 0
PAINLEVEL_OUTOF10: 0
PAINLEVEL_OUTOF10: 5
PAINLEVEL_OUTOF10: 3
PAINLEVEL_OUTOF10: 0
PAINLEVEL_OUTOF10: 4

## 2023-07-12 ASSESSMENT — PAIN DESCRIPTION - ORIENTATION
ORIENTATION: MID

## 2023-07-12 ASSESSMENT — PAIN DESCRIPTION - DESCRIPTORS
DESCRIPTORS: ACHING
DESCRIPTORS: DISCOMFORT
DESCRIPTORS: DISCOMFORT
DESCRIPTORS: SHARP
DESCRIPTORS: ACHING
DESCRIPTORS: ACHING
DESCRIPTORS: THROBBING
DESCRIPTORS: DISCOMFORT
DESCRIPTORS: THROBBING
DESCRIPTORS: ACHING

## 2023-07-12 ASSESSMENT — PAIN DESCRIPTION - FREQUENCY
FREQUENCY: CONTINUOUS
FREQUENCY: INTERMITTENT
FREQUENCY: INTERMITTENT

## 2023-07-12 ASSESSMENT — PAIN - FUNCTIONAL ASSESSMENT
PAIN_FUNCTIONAL_ASSESSMENT: PREVENTS OR INTERFERES SOME ACTIVE ACTIVITIES AND ADLS
PAIN_FUNCTIONAL_ASSESSMENT: ACTIVITIES ARE NOT PREVENTED
PAIN_FUNCTIONAL_ASSESSMENT: PREVENTS OR INTERFERES SOME ACTIVE ACTIVITIES AND ADLS

## 2023-07-12 ASSESSMENT — PAIN DESCRIPTION - PAIN TYPE
TYPE: ACUTE PAIN

## 2023-07-12 ASSESSMENT — PAIN DESCRIPTION - LOCATION
LOCATION: CHEST
LOCATION: CHEST
LOCATION: BACK
LOCATION: CHEST
LOCATION: CHEST
LOCATION: BACK
LOCATION: CHEST
LOCATION: BACK

## 2023-07-12 ASSESSMENT — PAIN DESCRIPTION - ONSET
ONSET: SUDDEN
ONSET: ON-GOING
ONSET: ON-GOING

## 2023-07-12 NOTE — CONSULTS
obtained from:    [x] All available Consultant notes from yesterday/today were reviewed  [x] All current labs were reviewed and interpreted for clinical significance   [x] Appropriate follow-up labs were ordered    Data: reviewed   LABS:   Lab Results   Component Value Date/Time     07/12/2023 05:18 AM    K 3.9 07/12/2023 05:18 AM     07/12/2023 05:18 AM    CO2 21 07/12/2023 05:18 AM    BUN 9 07/12/2023 05:18 AM    CREATININE 0.6 07/12/2023 05:18 AM    LABGLOM >60 07/12/2023 05:18 AM    GLUCOSE 128 07/12/2023 05:18 AM    PHOS 4.2 07/12/2023 05:18 AM    MG 1.80 07/12/2023 05:18 AM    CALCIUM 8.4 07/12/2023 05:18 AM     Lab Results   Component Value Date/Time    WBC 6.4 07/12/2023 05:18 AM    RBC 3.99 07/12/2023 05:18 AM    HGB 11.3 07/12/2023 05:18 AM    HCT 35.2 07/12/2023 05:18 AM    MCV 88.1 07/12/2023 05:18 AM    RDW 13.2 07/12/2023 05:18 AM     07/12/2023 05:18 AM     Lab Results   Component Value Date    INR 0.94 07/11/2023    PROTIME 12.6 07/11/2023       Neuroimaging was independently reviewed by myself and discussed results with the patient  Reviewed notes from different physicians including H&P and ED notes. Reviewed lab and blood testing    Impression:    Acute speech disturbance, so far unclear etiology. CT head and CTA Head and Neck are pending. We will check MRI brain to rule out acute ischemic stroke  Hypertension  NSTEMI, s/p cardiac stenting. Cardiology following. Started on DAPT and high intensity statin  Former Smoker    Recommendation:     MRI Brain  Follow up CT head and CTA head and neck  Echo is pending  Follow up lipid panel and A1c  DAPT   Statin  Blood pressure monitor and control  PT and OT   Speech   DVT and GI prophylaxis  Neurochecks  Telemetry  Stroke education and prevention was discussed with the patient   We will follow        Thank you for referring such patient. If you have any questions regarding my consult note, please don't hesitate to call me.      [employer] SEVERINO Alfaro - CNP    Attending Supervising Physician's Attestation Statement   I reviewed and agree with the findings and plan as documented in NP consult note   Patient was admitted for acute aphasia. Likely TIA/CVA  MRI of the brain  Echo  Blood pressure control  DAPT  Stroke prevention  We will follow    Electronically signed by Ananth Hernandez MD on 7/13/23 at 3:19 PM EDT       This dictation was generated by voice recognition computer software.  Although all attempts are made to edit the dictation for accuracy, there may be errors in the  transcription that are not intended

## 2023-07-12 NOTE — PROGRESS NOTES
Administered 0.4 mg SL Nitro, ineffective. 5 mg PO Percocet, effective. IV Phenergan, effective. See MAR. Pt resting comfortably in bed.

## 2023-07-12 NOTE — PROGRESS NOTES
V2.0    [facility] Progress Note      Name:  Nigel Chambers /Age/Sex: 1965  (65 y.o. male)   MRN & CSN:  [id number] & [id number] Encounter Date/Time: 2023 1:18 PM EDT   Location:  [address] PCP: No primary care provider on file. Jonathan Denson MD       Hospital Day: 2    Assessment and Recommendations   Nigel Chambers is a 65 y.o. male with a pmh of HTN who presented with Chest pain admitted with ACS. In the ED initial troponin was 20 with EKG NSR with nonspecific diffuse ST-T changes. Hs troponin trended up to 491-->905 with repeat EKG showing acute MI in the lateral leads. Patient was started on heparin drip and taken to the Cath Lab. Plan:     NSTEMI:  Cardiology consulted: PCI-LCX (culprit) and RCA (dominant) on 23; moderate nonobstructive LAD. Continue DAPT (ASA 81 mg long term/Clopidogrel 75 for at least one year). High intensity statin, low dose beta blocker, ARB. , HDL 37, A1c pending. HTN: Monitor BP on metoprolol, Valsartan. Former smoker: Quit 2 months ago. Smoking cessation reiterated. Slurred Speech:  No other focal deficits. Follow up CT head, CTA head/neck. Neurology consulted. Chronic Back Pain: stable on Home medications. Diet ADULT DIET; Regular; Low Fat/Low Chol/High Fiber/2 gm Na   DVT Prophylaxis [x] Lovenox, []  Heparin, [] SCDs, [] Ambulation,  [] Eliquis, [] Xarelto  [] Coumadin   Code Status Full Code   Disposition From: Home  Expected Disposition: Home  Estimated Date of Discharge: 2023  Patient requires continued admission due to NSTEMI   Surrogate Decision Maker/ POA Spouse     Personally reviewed Lab Studies and Imaging       Subjective:     Chief Complaint: Chest pain    Patient seen and examined. Chest pain improved.   c/o intermittent episodes of fluttering sensation in the chest.    Did not mention any concerns of slurred speech to but discussed with cardiologist.  Stat CT head, CTA head and neck

## 2023-07-12 NOTE — BRIEF OP NOTE
Cardiac Cath 7/11/2023:  Access: Right RA  Ultrasound: Ultrasound guidance used to determine after mentioned artery patency, size (> 2 mm), anatomic variations and ideal puncture location. Real-time ultrasound utilized concurrent with vascular needle entry into the artery. Images permanently recorded and reported in the patient chart. Hemostasis: TR band    Moderate Sedation:  Start time: 1748  Stop time: 1929  8 mg versed   300 mcg fentanyl   An independent trained observer pushed medications at my direction. We monitored the patient's level of consciousness and vital signs/physiologic status throughout the procedure duration (see start and start times above). Bleeding risk: Low  LVEDP: 2 mmHg  AO: 147/78 mmHg  Estimated blood loss: Less than 20 mL  Contrast: 174 mL  Fluoroscopy time: 33.0 min. Anatomy:   LM-normal  LAD-40 to 50% mid, calcified  Cx-99% mid  OM1-90% ostial, small, acute takeoff  OM2 90% proximal, calcified  RCA-80% mid, calcified, tortuous, dominant  RPDA-normal  LVEF-55%  PCI: CX 99% to 0% mid and % to 10% proximal both covered with a 2.5 mm x 48 mm [employer] SABINA postdilated with 3.0 mm NC balloon throughout and 3.5 mm NC balloon from proximal OM 2 to proximal Cx. RCA 80% to 30% mid LAD 3.5 mm x 48 mm [employer] SABINA postdilated with 3.5 mm NC balloon throughout and 4.0 mm NC balloon in the mid and proximal areas of stenting. Due to heavy calcification, 30% residual stenosis still present. Would need shockwave therapy if additional intervention needed. Impression:  1. Critical stenosis of the mid circumflex, culprit lesion for non-STEMI, as well as OM 2 successfully revascularized with SABINA x1 covering both. 2.  Severe stenosis of RCA with successful revascularization with SABINA x1. Residual 30% stenosis secondary to calcification in the most torturous area of the RCA.   Would need shockwave therapy to achieve better residual stenosis if

## 2023-07-12 NOTE — PROGRESS NOTES
Pt admitted to room 5904 from ED. VSS. Pt A&Ox4. Pt c/o N/V and MD JULISA notified. POC updated with pt and spouse, all questions answered. Oriented pt to room and call light. Call light and bedside table within reach. Instructed to call out with any needs.

## 2023-07-12 NOTE — PROGRESS NOTES
Neurology oncall and hospitalist notified of MRI results.  No new orders per neurologist; no need to transfer pt to 3T per neurologist.

## 2023-07-12 NOTE — PROGRESS NOTES
Cardiovascular Progress Note    Chief Complaint:   Chief Complaint   Patient presents with    Chest Pain     Pt arrived via squad with chest pain with nausea 45 minutes ago. 4 ASA 81mg and 1 nitro spray given in squad     Impression/Recommendations:    NSTEMI   CAD: S/P PCI-LCX (culprit) and RCA (dominant) Dr. Neto Pham 7/11/23; moderate nonobstructive LAD  Hypertension   Nicotine dependence, in remission (quit two months ago)    Stat CT head, CTA head/neck for speech concerns (estimated 16 hours). Neurology consulted for additional recs. Continue DAPT (ASA 81 mg long term/Clopidogrel 75 for at least one year). High intensity statin. Tolerating low dose beta blocker, ARB. Echo pending. Interval History:  Pt. S/E at noon. Epigastric pain and nausea resolved last night. Eating well. Chest is with \"4/10 ache, not sharp, better\". No shortness of breath, edema. Toward end of discussion, he relayed concerns that he has not \"had good control of tongue and making effort to keep from slurring\" with speech since last night. No other deficits. Wife at bedside. 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 Right radial c/d/I    NSVT at 2000 yesterday. Occasional PVCs since.      Medications:  sodium chloride flush 0.9 % injection 5-40 mL, 2 times per day  sodium chloride flush 0.9 % injection 5-40 mL, PRN  0.9 % sodium chloride infusion, PRN  ondansetron (ZOFRAN-ODT) disintegrating tablet 4 mg, Q8H PRN   Or  ondansetron (ZOFRAN) injection 4 mg, Q6H PRN  acetaminophen (TYLENOL) tablet 650 mg, Q6H PRN   Or  acetaminophen (TYLENOL) suppository 650 mg, Q6H PRN  polyethylene glycol (GLYCOLAX) packet 17 g, Daily PRN  perflutren lipid microspheres (DEFINITY) injection 1.5 mL, ONCE PRN  enoxaparin (LOVENOX) injection 40 mg, Daily  nitroGLYCERIN (NITROSTAT) SL tablet 0.4 mg, Q5 Min PRN  oxyCODONE-acetaminophen (PERCOCET) 5-325 MG per tablet 1 tablet, BID  pregabalin

## 2023-07-12 NOTE — CARE COORDINATION
07/12/23 1433   IMM Letter   IMM Letter given to Patient/Family/Significant other/Guardian/POA/by: IMM letter and IMT given to patient by CM   IMM Letter date given: 07/12/23   IMM Letter time given: 1205     Patient in agreement with not having the four hours Notice. Patient was also provided with the IMT.

## 2023-07-13 ENCOUNTER — APPOINTMENT (OUTPATIENT)
Dept: CT IMAGING | Age: 58
End: 2023-07-13
Payer: OTHER GOVERNMENT

## 2023-07-13 LAB
ANION GAP SERPL CALCULATED.3IONS-SCNC: 11 MMOL/L (ref 3–16)
BUN SERPL-MCNC: 9 MG/DL (ref 7–20)
CALCIUM SERPL-MCNC: 8.8 MG/DL (ref 8.3–10.6)
CHLORIDE SERPL-SCNC: 102 MMOL/L (ref 99–110)
CO2 SERPL-SCNC: 23 MMOL/L (ref 21–32)
CREAT SERPL-MCNC: 0.6 MG/DL (ref 0.9–1.3)
DEPRECATED RDW RBC AUTO: 13.6 % (ref 12.4–15.4)
EKG ATRIAL RATE: 91 BPM
EKG DIAGNOSIS: NORMAL
EKG P AXIS: 43 DEGREES
EKG P-R INTERVAL: 120 MS
EKG Q-T INTERVAL: 358 MS
EKG QRS DURATION: 84 MS
EKG QTC CALCULATION (BAZETT): 440 MS
EKG R AXIS: -39 DEGREES
EKG T AXIS: -32 DEGREES
EKG VENTRICULAR RATE: 91 BPM
EST. AVERAGE GLUCOSE BLD GHB EST-MCNC: 85.3 MG/DL
FERRITIN SERPL IA-MCNC: 535.8 NG/ML (ref 30–400)
GFR SERPLBLD CREATININE-BSD FMLA CKD-EPI: >60 ML/MIN/{1.73_M2}
GLUCOSE SERPL-MCNC: 102 MG/DL (ref 70–99)
HBA1C MFR BLD: 4.6 %
HCT VFR BLD AUTO: 36.9 % (ref 40.5–52.5)
HGB BLD-MCNC: 11.8 G/DL (ref 13.5–17.5)
IRON SATN MFR SERPL: 35 % (ref 20–50)
IRON SERPL-MCNC: 89 UG/DL (ref 59–158)
LV EF: 58 %
LVEF MODALITY: NORMAL
MCH RBC QN AUTO: 28.1 PG (ref 26–34)
MCHC RBC AUTO-ENTMCNC: 31.9 G/DL (ref 31–36)
MCV RBC AUTO: 88.1 FL (ref 80–100)
PLATELET # BLD AUTO: 210 K/UL (ref 135–450)
PMV BLD AUTO: 9.1 FL (ref 5–10.5)
POTASSIUM SERPL-SCNC: 4.1 MMOL/L (ref 3.5–5.1)
RBC # BLD AUTO: 4.18 M/UL (ref 4.2–5.9)
SODIUM SERPL-SCNC: 136 MMOL/L (ref 136–145)
TIBC SERPL-MCNC: 256 UG/DL (ref 260–445)
TROPONIN, HIGH SENSITIVITY: 911 NG/L (ref 0–22)
TROPONIN, HIGH SENSITIVITY: 924 NG/L (ref 0–22)
TROPONIN, HIGH SENSITIVITY: 956 NG/L (ref 0–22)
WBC # BLD AUTO: 6.7 K/UL (ref 4–11)

## 2023-07-13 PROCEDURE — 97530 THERAPEUTIC ACTIVITIES: CPT

## 2023-07-13 PROCEDURE — 93010 ELECTROCARDIOGRAM REPORT: CPT | Performed by: INTERNAL MEDICINE

## 2023-07-13 PROCEDURE — 80048 BASIC METABOLIC PNL TOTAL CA: CPT

## 2023-07-13 PROCEDURE — 2060000000 HC ICU INTERMEDIATE R&B

## 2023-07-13 PROCEDURE — 93308 TTE F-UP OR LMTD: CPT

## 2023-07-13 PROCEDURE — 6370000000 HC RX 637 (ALT 250 FOR IP): Performed by: NURSE PRACTITIONER

## 2023-07-13 PROCEDURE — 97161 PT EVAL LOW COMPLEX 20 MIN: CPT

## 2023-07-13 PROCEDURE — 71260 CT THORAX DX C+: CPT

## 2023-07-13 PROCEDURE — 84484 ASSAY OF TROPONIN QUANT: CPT

## 2023-07-13 PROCEDURE — 97165 OT EVAL LOW COMPLEX 30 MIN: CPT

## 2023-07-13 PROCEDURE — 6360000002 HC RX W HCPCS: Performed by: NURSE PRACTITIONER

## 2023-07-13 PROCEDURE — 6370000000 HC RX 637 (ALT 250 FOR IP): Performed by: INTERNAL MEDICINE

## 2023-07-13 PROCEDURE — 6360000004 HC RX CONTRAST MEDICATION: Performed by: PSYCHIATRY & NEUROLOGY

## 2023-07-13 PROCEDURE — 2580000003 HC RX 258: Performed by: NURSE PRACTITIONER

## 2023-07-13 PROCEDURE — 6360000002 HC RX W HCPCS: Performed by: INTERNAL MEDICINE

## 2023-07-13 PROCEDURE — 36415 COLL VENOUS BLD VENIPUNCTURE: CPT

## 2023-07-13 PROCEDURE — 85027 COMPLETE CBC AUTOMATED: CPT

## 2023-07-13 PROCEDURE — C9113 INJ PANTOPRAZOLE SODIUM, VIA: HCPCS | Performed by: NURSE PRACTITIONER

## 2023-07-13 PROCEDURE — 99233 SBSQ HOSP IP/OBS HIGH 50: CPT | Performed by: PSYCHIATRY & NEUROLOGY

## 2023-07-13 PROCEDURE — 93005 ELECTROCARDIOGRAM TRACING: CPT | Performed by: INTERNAL MEDICINE

## 2023-07-13 PROCEDURE — 2580000003 HC RX 258: Performed by: INTERNAL MEDICINE

## 2023-07-13 PROCEDURE — 99233 SBSQ HOSP IP/OBS HIGH 50: CPT | Performed by: INTERNAL MEDICINE

## 2023-07-13 RX ORDER — SODIUM CHLORIDE 9 MG/ML
INJECTION, SOLUTION INTRAVENOUS CONTINUOUS
Status: DISCONTINUED | OUTPATIENT
Start: 2023-07-13 | End: 2023-07-13

## 2023-07-13 RX ORDER — KETOROLAC TROMETHAMINE 30 MG/ML
30 INJECTION, SOLUTION INTRAMUSCULAR; INTRAVENOUS ONCE
Status: COMPLETED | OUTPATIENT
Start: 2023-07-13 | End: 2023-07-13

## 2023-07-13 RX ORDER — PANTOPRAZOLE SODIUM 40 MG/10ML
40 INJECTION, POWDER, LYOPHILIZED, FOR SOLUTION INTRAVENOUS DAILY
Status: DISCONTINUED | OUTPATIENT
Start: 2023-07-13 | End: 2023-07-14 | Stop reason: HOSPADM

## 2023-07-13 RX ADMIN — ASPIRIN 81 MG: 81 TABLET, COATED ORAL at 09:07

## 2023-07-13 RX ADMIN — PANTOPRAZOLE SODIUM 40 MG: 40 INJECTION, POWDER, FOR SOLUTION INTRAVENOUS at 13:38

## 2023-07-13 RX ADMIN — Medication 10 ML: at 09:08

## 2023-07-13 RX ADMIN — SODIUM CHLORIDE: 9 INJECTION, SOLUTION INTRAVENOUS at 10:30

## 2023-07-13 RX ADMIN — LIDOCAINE HYDROCHLORIDE: 20 SOLUTION ORAL; TOPICAL at 11:47

## 2023-07-13 RX ADMIN — ENOXAPARIN SODIUM 40 MG: 100 INJECTION SUBCUTANEOUS at 09:06

## 2023-07-13 RX ADMIN — IOPAMIDOL 75 ML: 755 INJECTION, SOLUTION INTRAVENOUS at 12:07

## 2023-07-13 RX ADMIN — PREGABALIN 150 MG: 75 CAPSULE ORAL at 20:05

## 2023-07-13 RX ADMIN — PREGABALIN 150 MG: 75 CAPSULE ORAL at 09:07

## 2023-07-13 RX ADMIN — VALSARTAN 40 MG: 40 TABLET, FILM COATED ORAL at 09:08

## 2023-07-13 RX ADMIN — PANTOPRAZOLE SODIUM 40 MG: 40 TABLET, DELAYED RELEASE ORAL at 06:24

## 2023-07-13 RX ADMIN — NITROGLYCERIN 0.4 MG: 0.4 TABLET, ORALLY DISINTEGRATING SUBLINGUAL at 09:14

## 2023-07-13 RX ADMIN — KETOROLAC TROMETHAMINE 30 MG: 30 INJECTION, SOLUTION INTRAMUSCULAR at 11:51

## 2023-07-13 RX ADMIN — NITROGLYCERIN 0.4 MG: 0.4 TABLET, ORALLY DISINTEGRATING SUBLINGUAL at 09:57

## 2023-07-13 RX ADMIN — OXYCODONE AND ACETAMINOPHEN 1 TABLET: 5; 325 TABLET ORAL at 20:05

## 2023-07-13 RX ADMIN — METOPROLOL TARTRATE 25 MG: 25 TABLET, FILM COATED ORAL at 09:08

## 2023-07-13 RX ADMIN — METOPROLOL TARTRATE 25 MG: 25 TABLET, FILM COATED ORAL at 20:06

## 2023-07-13 RX ADMIN — ROSUVASTATIN 40 MG: 10 TABLET, FILM COATED ORAL at 20:05

## 2023-07-13 RX ADMIN — CLOPIDOGREL BISULFATE 75 MG: 75 TABLET ORAL at 09:07

## 2023-07-13 RX ADMIN — Medication 10 ML: at 20:07

## 2023-07-13 RX ADMIN — OXYCODONE AND ACETAMINOPHEN 1 TABLET: 5; 325 TABLET ORAL at 09:07

## 2023-07-13 ASSESSMENT — PAIN DESCRIPTION - LOCATION
LOCATION: CHEST
LOCATION: CHEST
LOCATION: BACK
LOCATION: CHEST
LOCATION: BACK

## 2023-07-13 ASSESSMENT — PAIN SCALES - GENERAL
PAINLEVEL_OUTOF10: 0
PAINLEVEL_OUTOF10: 5
PAINLEVEL_OUTOF10: 5
PAINLEVEL_OUTOF10: 0
PAINLEVEL_OUTOF10: 6
PAINLEVEL_OUTOF10: 3
PAINLEVEL_OUTOF10: 5

## 2023-07-13 ASSESSMENT — PAIN DESCRIPTION - DESCRIPTORS
DESCRIPTORS: ACHING
DESCRIPTORS: DISCOMFORT

## 2023-07-13 ASSESSMENT — PAIN DESCRIPTION - ORIENTATION: ORIENTATION: MID

## 2023-07-13 NOTE — PROGRESS NOTES
[facility] Department   Phone: [phone]    Occupational Therapy    [x] Initial Evaluation            [] Daily Treatment Note         [x] Discharge Summary      Patient: Jennifer Martínez   : 1965   MRN: [id number]   Date of Service:  2023    Admitting Diagnosis: Chest pain     Current Admission Summary: Jennifer Martínez is a 65 y.o. male with a history of hypertension and tobacco abuse who presents to the emergency department today via ambulance complaining of chest pain. Patient states he is a  from [place]. He states he woke up this morning around 7:30 AM with chest pain. He describes a pressure that is a constant, 4/10 pain that localizes to his left chest and occasionally radiates to his left jaw. Patient states he did feel short of breath initially but does not feel short of breath at this time. He denies palpitations, diaphoresis, lightheadedness or dizziness. He denies fevers, chills, coughing or recent illness. He has no GI complaints. He states he quit smoking 2 months ago and has been smoking since he was 26years old. Past Medical History:  has a past medical history of Hypertension and Pericarditis. Past Surgical History:  has a past surgical history that includes back surgery. Discharge Recommendations: Jennifer Martínez scored a 24/24 on the AM-PAC short mobility form. At this time, no further OT is recommended upon discharge due to patient at independent level. Recommend patient returns to prior setting with prior services. DME Required For Discharge: No new DME required  Precautions/Restrictions: high fall risk  Positional Restrictions:no positional restrictions     Pre-Admission Information   Lives With: spouse                  Type of Home: house  Home Layout: two level, 10 stairs to 2nd level with L HR  Home Access: 6 step to enter with handrail. Handrails are located on B side.   Bathroom Layout: walk in shower  Bathroom

## 2023-07-13 NOTE — PROGRESS NOTES
Patient feeling much better. Chest discomfort has resolved. Epigastric comfort much improved. He states that the use of GI cocktail did help the epigastric discomfort. He states he is feeling good right now. Appreciate GI consultation and agree with plans for EGD.

## 2023-07-13 NOTE — CONSULTS
[address] [facility]  GI Consult Note    Patient: Jessica Lucas  : 1965  Acct#:      Date:  2023    Subjective:       History of Present Illness  Patient is a 65 y.o.  male admit with mi/cath/stent and cva whom we are consulted for persistent chest discomfort. Pt states had reflux 10 years ago, had egd approx , was on ppi in past, but no ppi, no reflux past few years. Was admit chest pain, found mi, had cath/stents placed, but then still some discomfort felt noncardiac. Cta chest neg pe. Pt ate breakfast this am without sx. States feeling some better this afternoon, chest discomfort not completely resolved but improved. No dysphagia. Past Medical History:   Diagnosis Date    Hypertension     Pericarditis       Past Surgical History:   Procedure Laterality Date    BACK SURGERY          Admission Meds  No current facility-administered medications on file prior to encounter. Current Outpatient Medications on File Prior to Encounter   Medication Sig Dispense Refill    amLODIPine (NORVASC) 5 MG tablet Take 1 tablet by mouth daily      pregabalin (LYRICA) 150 MG capsule Take 1 capsule by mouth 2 times daily. Max Daily Amount: 300 mg      oxyCODONE-acetaminophen (PERCOCET) 5-325 MG per tablet Take 1 tablet by mouth 2 times daily.  Max Daily Amount: 2 tablets           Allergies  Allergies   Allergen Reactions    Pcn [Penicillins] Anaphylaxis    Zomig [Zolmitriptan] Anaphylaxis    Mushroom Extract Complex         Family history     Family History   Problem Relation Age of Onset    Heart Disease Mother         Social   Social History     Tobacco Use    Smoking status: Former     Types: Cigarettes    Smokeless tobacco: Never   Substance Use Topics    Alcohol use: Yes     Comment: couple times a month          Review of Systems    Constitutional: negative  Respiratory: negative  Cardiovascular: chest discomfort  Gastrointestinal: as above  Musculoskeletal:negative  Neurological: negative

## 2023-07-13 NOTE — PROGRESS NOTES
V2.0    [facility] Progress Note      Name:  Kira Fernandez /Age/Sex: 1965  (65 y.o. male)   MRN & CSN:  [id number] & [id number] Encounter Date/Time: 2023 1:18 PM EDT   Location:  [id number] PCP: No primary care provider on file. Sheryl Kitchen MD       Hospital Day: 3    Assessment and Recommendations   Kira Fernandez is a 65 y.o. male with a pmh of HTN who presented with Chest pain admitted with ACS. In the ED initial troponin was 20 with EKG NSR with nonspecific diffuse ST-T changes. Hs troponin trended up to 491-->905 with repeat EKG showing acute MI in the lateral leads. Patient was started on heparin drip and taken to the Cath Lab. 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 Plan:     NSTEMI:  Cardiology consulted: PCI-LCX (culprit) and RCA (dominant) on 23; moderate nonobstructive LAD. Continue DAPT (ASA 81 mg long term/Clopidogrel 75 for at least one year). High intensity statin, Toprol-XL, ARB. , HDL 37, A1c 4.6%. Persistent chest pain discussed with cardiology; ? Inflammatory vs. GI related. Repeat Troponin & EKG stable. Recommended CTPA and GI consult. Started on IV Protonix & GI cocktail as needed       Acute ischemic stroke:  Slurred Speech resolved. No other focal deficits. CT head, CTA head/neck unremarkable. MRI brain showed 2 small punctate occipital lobe strokes. Neurology consult appreciated. Recommended 30-day cardiac event monitor to rule out cardiac etiology discharge. Patient is a . No driving until cleared by physician. Continue DAPT and statin. Lipid panel and A1c as above. Follow-up limited echo with bubble study. HTN: Monitor BP on Toprol-XL, Valsartan. Former smoker: Quit 2 months ago. Smoking cessation reiterated. Chronic Back Pain: stable on Home medications.        Diet Diet NPO Exceptions are: Sips of Water with Meds   DVT Prophylaxis

## 2023-07-13 NOTE — PROGRESS NOTES
dominant  RPDA-normal  LVEF-55%  PCI: CX 99% to 0% mid and % to 10% proximal both covered with a 2.5 mm x 48 mm [employer] SABINA postdilated with 3.0 mm NC balloon throughout and 3.5 mm NC balloon from proximal OM 2 to proximal Cx. RCA 80% to 30% mid LAD 3.5 mm x 48 mm [employer] SABINA postdilated with 3.5 mm NC balloon throughout and 4.0 mm NC balloon in the mid and proximal areas of stenting. Due to heavy calcification, 30% residual stenosis still present. Would need shockwave therapy if additional intervention needed. Impression:  1. Critical stenosis of the mid circumflex, culprit lesion for non-STEMI, as well as OM 2 successfully revascularized with SABINA x1 covering both. 2.  Severe stenosis of RCA with successful revascularization with SABINA x1. Residual 30% stenosis secondary to calcification in the most torturous area of the RCA. Would need shockwave therapy to achieve better residual stenosis if needed. 3.  Mild to moderate nonobstructive CAD involving the mid LAD. 4.  Normal LV systolic function. Note: Patient did have chest discomfort during intervention of the RCA. Also postprocedure he had epigastric discomfort which is most likely related to the medications including Plavix and nonfasting state. Plan:  1. DAPT with EC ASA 81 mg daily and Plavix 75 mg daily for 12 months before converting to aspirin monotherapy. 2.  Metoprolol 25 mg twice daily to start but can be converted to Toprol-XL 50 mg daily on discharge. Valsartan 40 mg daily. 3.  PPI while here in the hospital.  4.  High intensity statin. 5.  2D echo with Doppler. CT head: 7/12/23  IMPRESSION:  No acute intracranial abnormality. Mild periventricular small vessel ischemic change    CTA head/neck: 7/12/23  IMPRESSION:  Unremarkable CTA of the head and neck. MRI brain: 7/12/23  IMPRESSION:  Two punctate acute infarcts in the posterior circulation.     Problem List:   Patient Active chest discomfort and epigastric discomfort. When asked about the chest discomfort which brought him to the hospital, he states that that has resolved. He does describe more epigastric discomfort. ECG with inferior T wave inversion but not unexpected. Cardiac troponin is flat which overall is a good sign considering complex intervention. Concerning for a GI contribution to the discomfort. Also CVA is diagnosed based on clinical history and MRI. This would be most consistent with cardioembolic event related to cardiac cath with PCI. He states neurologic status is almost back to normal.    Impression/Plan:  1. Chest pain. Doubt cardiac etiology. Plan CTPA. GI cocktail. GI consultation. Dose of IV Toradol. Continue PPI. No need for cardiac Cath relook. 2.  CVA. Cardioembolic. Improving. Neurology recommendations recommended. Continue DAPT for at least 12 months, if no contraindication, from a cardiac standpoint.   Appreciate neurology

## 2023-07-13 NOTE — PROGRESS NOTES
Pt stated he had an episode of sharp CP that has since subsided and is now minimal discomfort. Administered SL Nitro, see MAR. Pt now reports he is CP free.

## 2023-07-13 NOTE — PROGRESS NOTES
shower, grab bars around toilet, built in shower seat  Toilet Height: standard height  Home Equipment: single point cane  Transfer Assistance: Independent without use of device  Ambulation Assistance:Independent without use of device  ADL Assistance: independent with all ADL's  IADL Assistance: independent with homemaking tasks  Active :        [x] Yes  [] No  Hand Dominance: [x] Left  [x] Right  Current Employment: full time employment. Occupation: over the road [employer]  Hobbies: fishing  Recent Falls: Denies falls. Examination   Vision:   Vision Gross Assessment: Impaired and Vision Corrective Device: wears glasses for distance  Hearing:   WFL  Observation:   General Observation:  Patient supine in bed w/ HOB elevated. Posture:   WFL  Sensation:   WFL  ROM:   (B) LE ROM WFL  Strength:   (B) LE gross strength WFL  Therapist Clinical Decision Making (Complexity): low complexity  Clinical Presentation: stable      Subjective  General: Patient reports being independent at baseline. He reports ongoing chest pain but nothing like what he was experiencing upon admission. Pain: 3/10.   Location: chest pain  Pain Interventions: not applicable       Functional Mobility  Bed Mobility  Supine to Sit: Independent  Sit to Supine: Independent  Scooting: Independent  Comments:  Transfers  Sit to stand transfer: Independent  Stand to sit transfer: Independent  Bed to chair transfer: Independent  Comments:  Ambulation  Surface:level surface  Assistive Device: no device  Assistance: Independent  Distance: 540'  Gait Mechanics: reciprocal, steady  Comments:    Stair Mobility  Number of Steps: 12  Step Height: 6 inch  Hand Rails: (R) ascending handrail  Device: no device  Assistance: modified independent  Comments:  Balance  Static Sitting Balance: good: independent with functional balance in unsupported position  Dynamic Sitting Balance: good: independent with functional balance in unsupported position  Static Standing

## 2023-07-14 ENCOUNTER — ANESTHESIA EVENT (OUTPATIENT)
Dept: ENDOSCOPY | Age: 58
End: 2023-07-14
Payer: OTHER GOVERNMENT

## 2023-07-14 ENCOUNTER — ANESTHESIA (OUTPATIENT)
Dept: ENDOSCOPY | Age: 58
End: 2023-07-14
Payer: OTHER GOVERNMENT

## 2023-07-14 VITALS
WEIGHT: 162.1 LBS | BODY MASS INDEX: 26.05 KG/M2 | DIASTOLIC BLOOD PRESSURE: 67 MMHG | HEIGHT: 66 IN | OXYGEN SATURATION: 97 % | RESPIRATION RATE: 16 BRPM | HEART RATE: 67 BPM | SYSTOLIC BLOOD PRESSURE: 116 MMHG | TEMPERATURE: 97.9 F

## 2023-07-14 PROBLEM — I10 HTN (HYPERTENSION): Status: ACTIVE | Noted: 2023-07-14

## 2023-07-14 PROBLEM — I63.9 CVA (CEREBRAL VASCULAR ACCIDENT) (HCC): Status: ACTIVE | Noted: 2023-07-14

## 2023-07-14 PROBLEM — E78.5 DYSLIPIDEMIA: Status: ACTIVE | Noted: 2023-07-14

## 2023-07-14 PROBLEM — I25.10 CORONARY ARTERY DISEASE DUE TO LIPID RICH PLAQUE: Status: ACTIVE | Noted: 2023-07-14

## 2023-07-14 PROBLEM — K21.9 GERD (GASTROESOPHAGEAL REFLUX DISEASE): Status: ACTIVE | Noted: 2023-07-14

## 2023-07-14 PROBLEM — D64.9 ANEMIA: Status: ACTIVE | Noted: 2023-07-14

## 2023-07-14 PROBLEM — I25.83 CORONARY ARTERY DISEASE DUE TO LIPID RICH PLAQUE: Status: ACTIVE | Noted: 2023-07-14

## 2023-07-14 PROBLEM — I21.4 NSTEMI (NON-ST ELEVATED MYOCARDIAL INFARCTION) (HCC): Status: ACTIVE | Noted: 2023-07-14

## 2023-07-14 LAB
ANION GAP SERPL CALCULATED.3IONS-SCNC: 12 MMOL/L (ref 3–16)
BUN SERPL-MCNC: 12 MG/DL (ref 7–20)
CALCIUM SERPL-MCNC: 9.1 MG/DL (ref 8.3–10.6)
CHLORIDE SERPL-SCNC: 101 MMOL/L (ref 99–110)
CO2 SERPL-SCNC: 24 MMOL/L (ref 21–32)
CREAT SERPL-MCNC: 0.5 MG/DL (ref 0.9–1.3)
DEPRECATED RDW RBC AUTO: 13.6 % (ref 12.4–15.4)
GFR SERPLBLD CREATININE-BSD FMLA CKD-EPI: >60 ML/MIN/{1.73_M2}
GLUCOSE SERPL-MCNC: 102 MG/DL (ref 70–99)
HCT VFR BLD AUTO: 35.7 % (ref 40.5–52.5)
HGB BLD-MCNC: 11.5 G/DL (ref 13.5–17.5)
MAGNESIUM SERPL-MCNC: 1.9 MG/DL (ref 1.8–2.4)
MCH RBC QN AUTO: 28 PG (ref 26–34)
MCHC RBC AUTO-ENTMCNC: 32.1 G/DL (ref 31–36)
MCV RBC AUTO: 87.3 FL (ref 80–100)
PHOSPHATE SERPL-MCNC: 3.7 MG/DL (ref 2.5–4.9)
PLATELET # BLD AUTO: 194 K/UL (ref 135–450)
PMV BLD AUTO: 8.2 FL (ref 5–10.5)
POTASSIUM SERPL-SCNC: 4.3 MMOL/L (ref 3.5–5.1)
RBC # BLD AUTO: 4.09 M/UL (ref 4.2–5.9)
SODIUM SERPL-SCNC: 137 MMOL/L (ref 136–145)
WBC # BLD AUTO: 6.8 K/UL (ref 4–11)

## 2023-07-14 PROCEDURE — 85027 COMPLETE CBC AUTOMATED: CPT

## 2023-07-14 PROCEDURE — 2709999900 HC NON-CHARGEABLE SUPPLY: Performed by: INTERNAL MEDICINE

## 2023-07-14 PROCEDURE — 36415 COLL VENOUS BLD VENIPUNCTURE: CPT

## 2023-07-14 PROCEDURE — 84100 ASSAY OF PHOSPHORUS: CPT

## 2023-07-14 PROCEDURE — 2500000003 HC RX 250 WO HCPCS: Performed by: REGISTERED NURSE

## 2023-07-14 PROCEDURE — 3609017100 HC EGD: Performed by: INTERNAL MEDICINE

## 2023-07-14 PROCEDURE — 2580000003 HC RX 258: Performed by: INTERNAL MEDICINE

## 2023-07-14 PROCEDURE — 6360000002 HC RX W HCPCS: Performed by: REGISTERED NURSE

## 2023-07-14 PROCEDURE — 7100000000 HC PACU RECOVERY - FIRST 15 MIN: Performed by: INTERNAL MEDICINE

## 2023-07-14 PROCEDURE — 3700000001 HC ADD 15 MINUTES (ANESTHESIA): Performed by: INTERNAL MEDICINE

## 2023-07-14 PROCEDURE — C9113 INJ PANTOPRAZOLE SODIUM, VIA: HCPCS | Performed by: NURSE PRACTITIONER

## 2023-07-14 PROCEDURE — 83735 ASSAY OF MAGNESIUM: CPT

## 2023-07-14 PROCEDURE — 6370000000 HC RX 637 (ALT 250 FOR IP): Performed by: INTERNAL MEDICINE

## 2023-07-14 PROCEDURE — 2580000003 HC RX 258: Performed by: REGISTERED NURSE

## 2023-07-14 PROCEDURE — 3700000000 HC ANESTHESIA ATTENDED CARE: Performed by: INTERNAL MEDICINE

## 2023-07-14 PROCEDURE — 6360000002 HC RX W HCPCS: Performed by: INTERNAL MEDICINE

## 2023-07-14 PROCEDURE — 80048 BASIC METABOLIC PNL TOTAL CA: CPT

## 2023-07-14 PROCEDURE — 6360000002 HC RX W HCPCS: Performed by: NURSE PRACTITIONER

## 2023-07-14 PROCEDURE — 7100000001 HC PACU RECOVERY - ADDTL 15 MIN: Performed by: INTERNAL MEDICINE

## 2023-07-14 RX ORDER — CLOPIDOGREL BISULFATE 75 MG/1
75 TABLET ORAL DAILY
Qty: 30 TABLET | Refills: 2 | Status: SHIPPED | OUTPATIENT
Start: 2023-07-15

## 2023-07-14 RX ORDER — PROPOFOL 10 MG/ML
INJECTION, EMULSION INTRAVENOUS PRN
Status: DISCONTINUED | OUTPATIENT
Start: 2023-07-14 | End: 2023-07-14 | Stop reason: SDUPTHER

## 2023-07-14 RX ORDER — SODIUM CHLORIDE 9 MG/ML
INJECTION, SOLUTION INTRAVENOUS CONTINUOUS
Status: DISCONTINUED | OUTPATIENT
Start: 2023-07-14 | End: 2023-07-14 | Stop reason: HOSPADM

## 2023-07-14 RX ORDER — ROSUVASTATIN CALCIUM 40 MG/1
40 TABLET, COATED ORAL NIGHTLY
Qty: 30 TABLET | Refills: 2 | Status: SHIPPED | OUTPATIENT
Start: 2023-07-14

## 2023-07-14 RX ORDER — PANTOPRAZOLE SODIUM 40 MG/1
40 TABLET, DELAYED RELEASE ORAL DAILY
Qty: 30 TABLET | Refills: 2 | Status: SHIPPED | OUTPATIENT
Start: 2023-07-14

## 2023-07-14 RX ORDER — ASPIRIN 81 MG/1
81 TABLET ORAL DAILY
Qty: 30 TABLET | Refills: 2 | Status: SHIPPED | OUTPATIENT
Start: 2023-07-15

## 2023-07-14 RX ORDER — PROPOFOL 10 MG/ML
INJECTION, EMULSION INTRAVENOUS CONTINUOUS PRN
Status: DISCONTINUED | OUTPATIENT
Start: 2023-07-14 | End: 2023-07-14 | Stop reason: SDUPTHER

## 2023-07-14 RX ORDER — PANTOPRAZOLE SODIUM 20 MG/1
40 TABLET, DELAYED RELEASE ORAL DAILY
Qty: 30 TABLET | Refills: 2 | Status: SHIPPED | OUTPATIENT
Start: 2023-07-14 | End: 2023-07-14 | Stop reason: SDUPTHER

## 2023-07-14 RX ORDER — LIDOCAINE HYDROCHLORIDE 20 MG/ML
INJECTION, SOLUTION EPIDURAL; INFILTRATION; INTRACAUDAL; PERINEURAL PRN
Status: DISCONTINUED | OUTPATIENT
Start: 2023-07-14 | End: 2023-07-14 | Stop reason: SDUPTHER

## 2023-07-14 RX ORDER — SODIUM CHLORIDE 9 MG/ML
INJECTION, SOLUTION INTRAVENOUS CONTINUOUS PRN
Status: DISCONTINUED | OUTPATIENT
Start: 2023-07-14 | End: 2023-07-14 | Stop reason: SDUPTHER

## 2023-07-14 RX ORDER — VALSARTAN 40 MG/1
40 TABLET ORAL DAILY
Qty: 30 TABLET | Refills: 2 | Status: SHIPPED | OUTPATIENT
Start: 2023-07-15

## 2023-07-14 RX ADMIN — PROPOFOL 70 MG: 10 INJECTION, EMULSION INTRAVENOUS at 08:54

## 2023-07-14 RX ADMIN — VALSARTAN 40 MG: 40 TABLET, FILM COATED ORAL at 09:46

## 2023-07-14 RX ADMIN — OXYCODONE AND ACETAMINOPHEN 1 TABLET: 5; 325 TABLET ORAL at 09:46

## 2023-07-14 RX ADMIN — SODIUM CHLORIDE, PRESERVATIVE FREE 10 ML: 5 INJECTION INTRAVENOUS at 09:50

## 2023-07-14 RX ADMIN — SODIUM CHLORIDE: 9 INJECTION, SOLUTION INTRAVENOUS at 09:34

## 2023-07-14 RX ADMIN — CLOPIDOGREL BISULFATE 75 MG: 75 TABLET ORAL at 09:46

## 2023-07-14 RX ADMIN — LIDOCAINE HYDROCHLORIDE 100 MG: 20 INJECTION, SOLUTION EPIDURAL; INFILTRATION; INTRACAUDAL; PERINEURAL at 08:54

## 2023-07-14 RX ADMIN — METOPROLOL TARTRATE 25 MG: 25 TABLET, FILM COATED ORAL at 09:46

## 2023-07-14 RX ADMIN — POLYETHYLENE GLYCOL 3350 17 G: 17 POWDER, FOR SOLUTION ORAL at 10:27

## 2023-07-14 RX ADMIN — SODIUM CHLORIDE: 9 INJECTION, SOLUTION INTRAVENOUS at 08:50

## 2023-07-14 RX ADMIN — PANTOPRAZOLE SODIUM 40 MG: 40 INJECTION, POWDER, FOR SOLUTION INTRAVENOUS at 09:47

## 2023-07-14 RX ADMIN — ENOXAPARIN SODIUM 40 MG: 100 INJECTION SUBCUTANEOUS at 09:47

## 2023-07-14 RX ADMIN — PROPOFOL 120 MCG/KG/MIN: 10 INJECTION, EMULSION INTRAVENOUS at 08:54

## 2023-07-14 RX ADMIN — PREGABALIN 150 MG: 75 CAPSULE ORAL at 09:46

## 2023-07-14 RX ADMIN — ASPIRIN 81 MG: 81 TABLET, COATED ORAL at 09:46

## 2023-07-14 ASSESSMENT — PAIN SCALES - GENERAL
PAINLEVEL_OUTOF10: 2
PAINLEVEL_OUTOF10: 2
PAINLEVEL_OUTOF10: 5
PAINLEVEL_OUTOF10: 4
PAINLEVEL_OUTOF10: 0

## 2023-07-14 ASSESSMENT — PAIN DESCRIPTION - LOCATION
LOCATION: CHEST
LOCATION: BACK
LOCATION: BACK
LOCATION: CHEST

## 2023-07-14 ASSESSMENT — PAIN DESCRIPTION - DESCRIPTORS: DESCRIPTORS: SORE

## 2023-07-14 ASSESSMENT — PAIN - FUNCTIONAL ASSESSMENT
PAIN_FUNCTIONAL_ASSESSMENT: ACTIVITIES ARE NOT PREVENTED
PAIN_FUNCTIONAL_ASSESSMENT: 0-10

## 2023-07-14 NOTE — PROGRESS NOTES
CLINICAL PHARMACY NOTE: MEDS TO BEDS    Total # of Prescriptions Filled: 6   The following medications were delivered to the patient:  Plavix 75 mg  Rosuvastatin 40 mg  Protonix 40 mg  Metoprolol 25 mg  Valsartan 40 mg  Aspirin 81 mg    Additional Documentation:  Delivered to Patient=Signed  Ok to be delivered per Jean-Paul Lyman CP

## 2023-07-14 NOTE — PROGRESS NOTES
Nutrition Education    Pt triggered for diet education consult on anti reflux diet per GI. EGD today showed distal esoph shallow nonbleeding ulcers. Provided handouts on GERD and peptic ulcer nutrition therapy; briefly reviewed with pt. This RD left name and phone number should pt have any questions regarding materials provided. Educated on 7/14  Learners: Patient  Readiness: Acceptance  Method: Explanation and Handout  Response: ManageSocial name and number provided.     Austin Miller MS, RD, LD  Contact Number: [phone]

## 2023-07-14 NOTE — PROGRESS NOTES
Egd on chart, 3 distal esoph shallow nonbleeding ulcers. Rec  protonix 40 mg daily (mg and creat normal)  dietician teaching anti reflux diet  repeat egd 3-6 months to ensure ulcer healing and no underlying horowitz's  check time last night meal, if early may need future (outpt) gastric emtpying study  otherwise will sign off for now, call if needed.

## 2023-07-14 NOTE — PROGRESS NOTES
Pt meets criteria to be transferred back to [facility]. Pt transported via stretcher with RN/transport/portable tele  monitor.   Report given to [facility] RN at bedside

## 2023-07-14 NOTE — ANESTHESIA POSTPROCEDURE EVALUATION
Department of Anesthesiology  Postprocedure Note    Patient: Violet Rangel  MRN: [id number]  YOB: 1965  Date of evaluation: 7/14/2023      Procedure Summary     Date: 07/14/23 Room / Location: [address] [place] [address]    Anesthesia Start: 4190 Anesthesia Stop: 8952    Procedure: EGD DIAGNOSTIC ONLY (Abdomen) Diagnosis:       Epigastric pain      (Epigastric pain [R10.13])    Surgeons: Jose Ford MD Responsible Provider: Amirah Huerta MD    Anesthesia Type: MAC ASA Status: 4          Anesthesia Type: No value filed.     Wilton Phase I: Wilton Score: 9    Wilton Phase II:        Anesthesia Post Evaluation    Patient location during evaluation: PACU  Patient participation: complete - patient participated  Level of consciousness: awake  Airway patency: patent  Nausea & Vomiting: no nausea and no vomiting  Complications: no  Cardiovascular status: blood pressure returned to baseline  Respiratory status: acceptable  Hydration status: stable

## 2023-07-14 NOTE — PROGRESS NOTES
Physician Progress Note      Sebastian Abel  CSN #:                  [id number]  :                       1965  ADMIT DATE:       2023 8:43 AM  DISCH DATE:  RESPONDING  PROVIDER #:        Camilo Nava MD          QUERY TEXT:    Pt admitted with chest pain and underwent LHC with SABINA x 2 on , noted   documentation of acute ischemic stroke. ? If possible, please document in   progress notes and discharge summary the relationship if any between the CVA   and the surgery: The medical record reflects the following:  Risk Factors: NSTEMI s/p LHC  Clinical Indicators:  Cardio note- \"Toward end of discussion, he relayed   concerns that he has not \"had good control of tongue and making effort to keep   from slurring\" with speech since last night. \"   IM PN- Acute ischemic stroke:  Slurred Speech resolved. No other focal deficits. CT head, CTA head/neck unremarkable. MRI brain showed 2 small punctate occipital lobe strokes. Treatment: CT head, CTA head/neck, MRI brain, Neuro consult, DAPT, statin  Options provided:  -- CVA is due to the procedure  -- CVA is not due to the procedure, but is due to other incidental risk   factor, Please specify other incidental risk factor. -- CVA unrelated to procedure  -- Other - I will add my own diagnosis  -- Disagree - Not applicable / Not valid  -- Disagree - Clinically unable to determine / Unknown  -- Refer to Clinical Documentation Reviewer    PROVIDER RESPONSE TEXT:    Provider is clinically unable to determine a response to this query. Query created by: Bob Mcclelland on 2023 11:23 AM      Electronically signed by:   Camilo Nava MD 2023 3:09 PM

## 2023-07-14 NOTE — PLAN OF CARE
Problem: Discharge Planning  Goal: Discharge to home or other facility with appropriate resources  7/13/2023 2343 by Jamal Castellano RN  Outcome: Progressing    Problem: Pain  Goal: Verbalizes/displays adequate comfort level or baseline comfort level  7/13/2023 2343 by Jamal Castellano RN  Outcome: Progressing     Problem: Safety - Adult  Goal: Free from fall injury  7/13/2023 2343 by Jamal Castellano RN  Outcome: Progressing     Problem: ABCDS Injury Assessment  Goal: Absence of physical injury  7/13/2023 2343 by Jamal Castellano RN  Outcome: Progressing     Problem: Cardiovascular - Adult  Goal: Maintains optimal cardiac output and hemodynamic stability  Outcome: Progressing  Flowsheets (Taken 7/13/2023 2343)  Maintains optimal cardiac output and hemodynamic stability:   Monitor blood pressure and heart rate   Monitor urine output and notify Licensed Independent Practitioner for values outside of normal range   Assess for signs of decreased cardiac output  Note: S/p PCIx2. Radial site within normal limits. Telemetry monitoring. Cardiology following. Problem: Gastrointestinal - Adult  Goal: Minimal or absence of nausea and vomiting  Outcome: Progressing  Note: Pt on PPI. GI consulted, NPO at midnight for EGD tomorrow. Will continue to monitor.

## 2023-07-14 NOTE — DISCHARGE SUMMARY
[address]ITAL[facility] DISCHARGE SUMMARY    Patient Demographics    PatientSeth Che  Date of Birth. 1965  MRN. [id number]     Primary care provider. No primary care provider on file. (Tel: None)    Admit date: 7/11/2023    Discharge date (blank if same as Note Date): Note Date: 7/14/2023     Reason for Hospitalization. Chief Complaint   Patient presents with    Chest Pain     Pt arrived via squad with chest pain with nausea 45 minutes ago. 4 ASA 81mg and 1 nitro spray given in squad         Significant Findings. Principal Problem:    NSTEMI (non-ST elevated myocardial infarction) ([address] )  Active Problems:    Coronary artery disease due to lipid rich plaque    CVA (cerebral vascular accident) ([address])    HTN (hypertension)    Dyslipidemia    Anemia    GERD (gastroesophageal reflux disease)    Chest pain    Speech disturbance  Resolved Problems:    * No resolved hospital problems. *       Problems and results from this hospitalization that need follow up. Acute CVA. Follow up primary cardiologist in [place] for consideration 30 day event monitor. CAD  Distal esophageal ulcer. Repeat EGD in 3-6 months to ensure ulcer healing and r/o underlying Barretts    Significant test results and incidental findings. CXR 7/11/2023:  IMPRESSION:  No acute cardiopulmonary process. Echo 7/12/2023:  Summary   Normal left ventricle size, wall thickness and systolic function with an estimated ejection fraction of 55-60%. No regional wall motion abnormalities are seen. Normal diastolic function. E/e' = 12.1. Trivial tricuspid regurgitation. RVSP = 19 mmHg. Trivial pericardial effusion. CT head 7/12/2023:  IMPRESSION:  No acute intracranial abnormality. Mild periventricular small vessel ischemic change     CTA head/neck 7/12/2023:  IMPRESSION:  Unremarkable CTA of the head and neck.      MRI brain

## 2023-07-14 NOTE — PROGRESS NOTES
Data- discharge order received, pt verbalized agreement to discharge, disposition to previous residence, no needs for HHC/DME. Action- discharge instructions prepared and given to patient, pt verbalized understanding. Medication information packet given r/t NEW and/or CHANGED prescriptions emphasizing name/purpose/side effects, pt verbalized understanding. Discharge instruction summary: Diet- Cardiac, Activity- as tolerated, Primary Care Physician as follows: No primary care provider on file. None f/u appointment 1 week, prescription medications filled meds to bed. Inpatient surgical procedure precautions reviewed: [facility]. CHF Education reviewed. Pt/ Family has had a total of 60 minutes CHF education this admission encounter. 1. WEIGHT: Admit Weight - Scale: 163 lb (73.9 kg) (07/11/23 0846)        Today  Weight - Scale: 162 lb 1.6 oz (73.5 kg) (07/14/23 0430)       2. O2 SAT.: SpO2: 97 % (07/14/23 1248)    Response- Pt belongings gathered, IV removed. Disposition is home (no HHC/DME needs), [facility] with family, [facility], no complications.

## (undated) DEVICE — VALVE SUCTION AIR H2O SET ORCA POD + DISP

## (undated) DEVICE — ENDOSCOPIC KIT 6X3/16 FT COLON W/ 1.1 OZ 2 GWN W/O BRSH

## (undated) DEVICE — BW-412T DISP COMBO CLEANING BRUSH: Brand: SINGLE USE COMBINATION CLEANING BRUSH

## (undated) DEVICE — AIR/WATER CLEANING ADAPTER FOR OLYMPUS® GI ENDOSCOPE: Brand: BULLDOG®

## (undated) DEVICE — MOUTHPIECE ENDOSCP L CTRL OPN AND SIDE PORTS DISP

## (undated) DEVICE — SOLUTION IV IRRIG WATER 500ML POUR BRL ST 2F7113

## (undated) DEVICE — GOWN AURORA NONREINF LG: Brand: MEDLINE INDUSTRIES, INC.